# Patient Record
Sex: MALE | Race: WHITE | Employment: FULL TIME | URBAN - METROPOLITAN AREA
[De-identification: names, ages, dates, MRNs, and addresses within clinical notes are randomized per-mention and may not be internally consistent; named-entity substitution may affect disease eponyms.]

---

## 2018-05-11 ENCOUNTER — OFFICE VISIT (OUTPATIENT)
Dept: FAMILY MEDICINE CLINIC | Age: 53
End: 2018-05-11

## 2018-05-11 VITALS
WEIGHT: 216.4 LBS | BODY MASS INDEX: 29.35 KG/M2 | HEART RATE: 90 BPM | SYSTOLIC BLOOD PRESSURE: 122 MMHG | OXYGEN SATURATION: 95 % | DIASTOLIC BLOOD PRESSURE: 72 MMHG

## 2018-05-11 DIAGNOSIS — M10.9 ACUTE GOUT INVOLVING TOE OF LEFT FOOT, UNSPECIFIED CAUSE: ICD-10-CM

## 2018-05-11 DIAGNOSIS — M10.9 ACUTE GOUT INVOLVING TOE OF LEFT FOOT, UNSPECIFIED CAUSE: Primary | ICD-10-CM

## 2018-05-11 DIAGNOSIS — M79.671 PAIN OF RIGHT HEEL: ICD-10-CM

## 2018-05-11 LAB — URIC ACID, SERUM: 8.9 MG/DL (ref 3.5–7.2)

## 2018-05-11 PROCEDURE — 99213 OFFICE O/P EST LOW 20 MIN: CPT | Performed by: FAMILY MEDICINE

## 2018-05-11 RX ORDER — COLCHICINE 0.6 MG/1
TABLET ORAL
Qty: 30 TABLET | Refills: 3 | Status: SHIPPED | OUTPATIENT
Start: 2018-05-11 | End: 2020-06-24

## 2018-05-11 ASSESSMENT — PATIENT HEALTH QUESTIONNAIRE - PHQ9
2. FEELING DOWN, DEPRESSED OR HOPELESS: 1
1. LITTLE INTEREST OR PLEASURE IN DOING THINGS: 1
SUM OF ALL RESPONSES TO PHQ QUESTIONS 1-9: 2
SUM OF ALL RESPONSES TO PHQ9 QUESTIONS 1 & 2: 2

## 2018-05-14 ENCOUNTER — PATIENT MESSAGE (OUTPATIENT)
Dept: FAMILY MEDICINE CLINIC | Age: 53
End: 2018-05-14

## 2018-05-14 RX ORDER — ALLOPURINOL 300 MG/1
300 TABLET ORAL DAILY
Qty: 30 TABLET | Refills: 3 | Status: SHIPPED | OUTPATIENT
Start: 2018-05-14 | End: 2018-08-14 | Stop reason: SDUPTHER

## 2018-05-14 RX ORDER — PREDNISONE 20 MG/1
40 TABLET ORAL DAILY
Qty: 14 TABLET | Refills: 0 | Status: SHIPPED | OUTPATIENT
Start: 2018-05-14 | End: 2018-05-21

## 2018-05-18 ENCOUNTER — HOSPITAL ENCOUNTER (OUTPATIENT)
Dept: VASCULAR LAB | Age: 53
Discharge: HOME | End: 2018-05-18
Attending: FAMILY MEDICINE | Admitting: FAMILY MEDICINE

## 2018-05-18 ENCOUNTER — OFFICE VISIT (OUTPATIENT)
Dept: FAMILY MEDICINE CLINIC | Age: 53
End: 2018-05-18

## 2018-05-18 VITALS
WEIGHT: 216.2 LBS | BODY MASS INDEX: 29.32 KG/M2 | OXYGEN SATURATION: 97 % | SYSTOLIC BLOOD PRESSURE: 112 MMHG | HEART RATE: 73 BPM | DIASTOLIC BLOOD PRESSURE: 72 MMHG

## 2018-05-18 DIAGNOSIS — M79.661 RIGHT CALF PAIN: Primary | ICD-10-CM

## 2018-05-18 DIAGNOSIS — M79.661 PAIN OF RIGHT LOWER LEG: ICD-10-CM

## 2018-05-18 DIAGNOSIS — I82.491 ACUTE DEEP VEIN THROMBOSIS (DVT) OF OTHER SPECIFIED VEIN OF RIGHT LOWER EXTREMITY (HCC): ICD-10-CM

## 2018-05-18 PROBLEM — I82.409 DEEP VENOUS THROMBOSIS (HCC): Status: ACTIVE | Noted: 2018-05-18

## 2018-05-18 PROCEDURE — 99213 OFFICE O/P EST LOW 20 MIN: CPT | Performed by: FAMILY MEDICINE

## 2018-06-28 ENCOUNTER — OFFICE VISIT (OUTPATIENT)
Dept: FAMILY MEDICINE CLINIC | Age: 53
End: 2018-06-28

## 2018-06-28 VITALS
BODY MASS INDEX: 28.77 KG/M2 | DIASTOLIC BLOOD PRESSURE: 78 MMHG | HEIGHT: 72 IN | WEIGHT: 212.4 LBS | SYSTOLIC BLOOD PRESSURE: 112 MMHG | HEART RATE: 97 BPM | OXYGEN SATURATION: 97 %

## 2018-06-28 DIAGNOSIS — M10.072 ACUTE IDIOPATHIC GOUT OF LEFT FOOT: ICD-10-CM

## 2018-06-28 DIAGNOSIS — Z23 NEED FOR DIPHTHERIA-TETANUS-PERTUSSIS (TDAP) VACCINE: ICD-10-CM

## 2018-06-28 DIAGNOSIS — Z00.00 WELL ADULT EXAM: Primary | ICD-10-CM

## 2018-06-28 DIAGNOSIS — I82.491 ACUTE DEEP VEIN THROMBOSIS (DVT) OF OTHER SPECIFIED VEIN OF RIGHT LOWER EXTREMITY (HCC): ICD-10-CM

## 2018-06-28 PROCEDURE — 99396 PREV VISIT EST AGE 40-64: CPT | Performed by: FAMILY MEDICINE

## 2018-06-28 PROCEDURE — 90715 TDAP VACCINE 7 YRS/> IM: CPT | Performed by: FAMILY MEDICINE

## 2018-06-28 PROCEDURE — 90471 IMMUNIZATION ADMIN: CPT | Performed by: FAMILY MEDICINE

## 2018-06-28 ASSESSMENT — PATIENT HEALTH QUESTIONNAIRE - PHQ9
SUM OF ALL RESPONSES TO PHQ9 QUESTIONS 1 & 2: 0
1. LITTLE INTEREST OR PLEASURE IN DOING THINGS: 0
2. FEELING DOWN, DEPRESSED OR HOPELESS: 0
SUM OF ALL RESPONSES TO PHQ QUESTIONS 1-9: 0

## 2018-06-28 NOTE — PROGRESS NOTES
Ameya Avila MD ([facility])  Discussed recommendations about if needs lifelong tx or not, will refer to heme for their recs    Acute idiopathic gout of left foot  -Stable, continue current medications. Discussed starting allopurinol when no sx for a month, if starts to flare can overlap with colchicine    Need for diphtheria-tetanus-pertussis (Tdap) vaccine  -     Tdap (age 10y-63y) IM (Adacel)    Other orders  -     zoster recombinant adjuvanted vaccine (SHINGRIX) 50 MCG SUSR injection;  Inject 0.5 mLs into the muscle once for 1 dose Repeat second dose in 2-6 months

## 2018-07-06 ENCOUNTER — TELEPHONE (OUTPATIENT)
Dept: FAMILY MEDICINE CLINIC | Age: 53
End: 2018-07-06

## 2018-08-14 RX ORDER — ALLOPURINOL 300 MG/1
300 TABLET ORAL DAILY
Qty: 30 TABLET | Refills: 5 | Status: SHIPPED | OUTPATIENT
Start: 2018-08-14 | End: 2018-09-14 | Stop reason: SDUPTHER

## 2018-08-14 NOTE — TELEPHONE ENCOUNTER
From: Osmar Kumar  Sent: 8/13/2018 9:52 PM EDT  Subject: Medication Renewal Request    Osmar Kumar would like a refill of the following medications:     allopurinol (ZYLOPRIM) 300 MG tablet Suzanna Loera MD]    Preferred pharmacy: [address] [place] [address] [phone] - F [phone]    Comment:

## 2018-09-14 RX ORDER — ALLOPURINOL 300 MG/1
300 TABLET ORAL DAILY
Qty: 30 TABLET | Refills: 5 | Status: SHIPPED | OUTPATIENT
Start: 2018-09-14 | End: 2018-10-22 | Stop reason: SDUPTHER

## 2018-09-14 NOTE — TELEPHONE ENCOUNTER
From: Navin Fraser  Sent: 9/13/2018 11:06 PM EDT  Subject: Medication Renewal Request    Navin Fraser would like a refill of the following medications:     allopurinol (ZYLOPRIM) 300 MG tablet Brittany Ribeiro MD]    Preferred pharmacy: [address] [place] [address] [phone] - F [phone]    Comment:

## 2018-10-19 RX ORDER — ALLOPURINOL 300 MG/1
300 TABLET ORAL DAILY
Qty: 30 TABLET | Refills: 5 | Status: CANCELLED | OUTPATIENT
Start: 2018-10-19

## 2018-10-19 NOTE — TELEPHONE ENCOUNTER
From: Giacomo Walter  Sent: 10/19/2018 9:37 AM EDT  Subject: Medication Renewal Request    Giacomo Walter would like a refill of the following medications:     allopurinol (ZYLOPRIM) 300 MG tablet Courtney Rodriguez MD]    Preferred pharmacy: [address] [place] 688 [phone] - F [phone]    Comment:

## 2018-10-22 RX ORDER — ALLOPURINOL 300 MG/1
300 TABLET ORAL DAILY
Qty: 30 TABLET | Refills: 5 | Status: SHIPPED | OUTPATIENT
Start: 2018-10-22 | End: 2019-05-07 | Stop reason: SDUPTHER

## 2018-11-28 RX ORDER — ALLOPURINOL 300 MG/1
300 TABLET ORAL DAILY
Qty: 30 TABLET | Refills: 5 | Status: CANCELLED | OUTPATIENT
Start: 2018-11-28

## 2019-05-07 RX ORDER — ALLOPURINOL 300 MG/1
300 TABLET ORAL DAILY
Qty: 30 TABLET | Refills: 1 | Status: SHIPPED | OUTPATIENT
Start: 2019-05-07 | End: 2019-07-05 | Stop reason: SDUPTHER

## 2019-07-06 RX ORDER — ALLOPURINOL 300 MG/1
300 TABLET ORAL DAILY
Qty: 30 TABLET | Refills: 0 | Status: SHIPPED | OUTPATIENT
Start: 2019-07-06 | End: 2019-10-08 | Stop reason: SDUPTHER

## 2019-08-02 RX ORDER — ALLOPURINOL 300 MG/1
300 TABLET ORAL DAILY
Qty: 30 TABLET | Refills: 0 | OUTPATIENT
Start: 2019-08-02

## 2019-08-05 RX ORDER — ALLOPURINOL 300 MG/1
300 TABLET ORAL DAILY
Qty: 30 TABLET | Refills: 0 | OUTPATIENT
Start: 2019-08-05

## 2019-09-30 RX ORDER — ALLOPURINOL 300 MG/1
300 TABLET ORAL DAILY
Qty: 30 TABLET | Refills: 0 | OUTPATIENT
Start: 2019-09-30

## 2019-10-08 ENCOUNTER — OFFICE VISIT (OUTPATIENT)
Dept: FAMILY MEDICINE CLINIC | Age: 54
End: 2019-10-08
Payer: COMMERCIAL

## 2019-10-08 VITALS
WEIGHT: 216 LBS | OXYGEN SATURATION: 98 % | DIASTOLIC BLOOD PRESSURE: 84 MMHG | BODY MASS INDEX: 29.26 KG/M2 | SYSTOLIC BLOOD PRESSURE: 126 MMHG | HEIGHT: 72 IN | HEART RATE: 80 BPM

## 2019-10-08 DIAGNOSIS — Z00.00 WELL ADULT EXAM: Primary | ICD-10-CM

## 2019-10-08 DIAGNOSIS — Z12.11 SCREEN FOR COLON CANCER: ICD-10-CM

## 2019-10-08 DIAGNOSIS — Z12.5 SCREENING PSA (PROSTATE SPECIFIC ANTIGEN): ICD-10-CM

## 2019-10-08 DIAGNOSIS — M10.072 ACUTE IDIOPATHIC GOUT OF LEFT FOOT: ICD-10-CM

## 2019-10-08 DIAGNOSIS — E78.00 PURE HYPERCHOLESTEROLEMIA: ICD-10-CM

## 2019-10-08 PROCEDURE — 99396 PREV VISIT EST AGE 40-64: CPT | Performed by: FAMILY MEDICINE

## 2019-10-08 RX ORDER — ALLOPURINOL 300 MG/1
300 TABLET ORAL DAILY
Qty: 90 TABLET | Refills: 3 | Status: SHIPPED | OUTPATIENT
Start: 2019-10-08 | End: 2019-10-08 | Stop reason: SDUPTHER

## 2019-10-08 RX ORDER — ALLOPURINOL 300 MG/1
300 TABLET ORAL DAILY
Qty: 30 TABLET | Refills: 3 | Status: SHIPPED | OUTPATIENT
Start: 2019-10-08 | End: 2020-12-22

## 2019-10-08 SDOH — HEALTH STABILITY: MENTAL HEALTH: HOW OFTEN DO YOU HAVE A DRINK CONTAINING ALCOHOL?: 2-3 TIMES A WEEK

## 2019-10-08 SDOH — HEALTH STABILITY: MENTAL HEALTH: HOW MANY STANDARD DRINKS CONTAINING ALCOHOL DO YOU HAVE ON A TYPICAL DAY?: 3 OR 4

## 2019-10-08 ASSESSMENT — PATIENT HEALTH QUESTIONNAIRE - PHQ9
1. LITTLE INTEREST OR PLEASURE IN DOING THINGS: 0
SUM OF ALL RESPONSES TO PHQ QUESTIONS 1-9: 0
SUM OF ALL RESPONSES TO PHQ9 QUESTIONS 1 & 2: 0
SUM OF ALL RESPONSES TO PHQ QUESTIONS 1-9: 0
2. FEELING DOWN, DEPRESSED OR HOPELESS: 0

## 2019-10-17 LAB
PROSTATE SPECIFIC ANTIGEN: 1.2 NG/ML
URIC ACID, SERUM: 5.7 MG/DL (ref 4–8)

## 2020-06-09 ENCOUNTER — APPOINTMENT (OUTPATIENT)
Dept: GENERAL RADIOLOGY | Age: 55
End: 2020-06-09
Payer: COMMERCIAL

## 2020-06-09 ENCOUNTER — HOSPITAL ENCOUNTER (EMERGENCY)
Age: 55
Discharge: HOME | End: 2020-06-09
Attending: EMERGENCY MEDICINE
Payer: COMMERCIAL

## 2020-06-09 VITALS
HEART RATE: 85 BPM | OXYGEN SATURATION: 95 % | TEMPERATURE: 98.5 F | RESPIRATION RATE: 18 BRPM | DIASTOLIC BLOOD PRESSURE: 83 MMHG | SYSTOLIC BLOOD PRESSURE: 139 MMHG

## 2020-06-09 PROCEDURE — 73060 X-RAY EXAM OF HUMERUS: CPT

## 2020-06-09 PROCEDURE — 99283 EMERGENCY DEPT VISIT LOW MDM: CPT

## 2020-06-09 PROCEDURE — 73080 X-RAY EXAM OF ELBOW: CPT

## 2020-06-09 ASSESSMENT — ENCOUNTER SYMPTOMS
ABDOMINAL PAIN: 0
NAUSEA: 0
SHORTNESS OF BREATH: 0
VOMITING: 0

## 2020-06-09 ASSESSMENT — PAIN SCALES - GENERAL: PAINLEVEL_OUTOF10: 5

## 2020-06-10 ENCOUNTER — TELEPHONE (OUTPATIENT)
Dept: ORTHOPEDIC SURGERY | Age: 55
End: 2020-06-10

## 2020-06-10 ENCOUNTER — OFFICE VISIT (OUTPATIENT)
Dept: ORTHOPEDIC SURGERY | Age: 55
End: 2020-06-10
Payer: COMMERCIAL

## 2020-06-10 VITALS — HEIGHT: 72 IN | BODY MASS INDEX: 29.26 KG/M2 | WEIGHT: 216 LBS | TEMPERATURE: 97.5 F

## 2020-06-10 PROCEDURE — 99203 OFFICE O/P NEW LOW 30 MIN: CPT | Performed by: ORTHOPAEDIC SURGERY

## 2020-06-10 NOTE — ED PROVIDER NOTES
Oropharynx is clear. Eyes:      General:         Right eye: No discharge. Left eye: No discharge. Conjunctiva/sclera: Conjunctivae normal.      Pupils: Pupils are equal, round, and reactive to light. Neck:      Musculoskeletal: Normal range of motion and neck supple. Cardiovascular:      Rate and Rhythm: Normal rate and regular rhythm. Heart sounds: Normal heart sounds. No murmur. No gallop. Pulmonary:      Effort: Pulmonary effort is normal. No respiratory distress. Breath sounds: Normal breath sounds. No wheezing or rales. Musculoskeletal:      Right shoulder: Normal.      Right elbow: He exhibits decreased range of motion. He exhibits no swelling, no effusion, no deformity and no laceration. Skin:     General: Skin is warm and dry. Capillary Refill: Capillary refill takes less than 2 seconds. Coloration: Skin is not pale. Neurological:      General: No focal deficit present. Mental Status: He is alert and oriented to person, place, and time. Psychiatric:         Mood and Affect: Mood normal.         Behavior: Behavior normal.         DIAGNOSTIC RESULTS   LABS:    Labs Reviewed - No data to display    All other labs were within normal range or not returned as of this dictation. EKG: All EKG's are interpreted by the Emergency Department Physician in the absence of a cardiologist.  Please see their note for interpretation of EKG. RADIOLOGY:   Non-plain film images such as CT, Ultrasound and MRI are read by the radiologist. Plain radiographic images are visualized and preliminarily interpreted by the ED Provider with the below findings:        Interpretation per the Radiologist below, if available at the time of this note:    XR HUMERUS RIGHT (MIN 2 VIEWS)   Final Result   Unremarkable radiographs right humerus. Follow-up imaging recommended if pain persists or worsens following   conservative management.          XR ELBOW RIGHT (MIN 3 VIEWS) NEUROVASCULAR INJURY, thus I consider the discharge disposition reasonable. FINAL IMPRESSION      1.  Traumatic rupture of right distal biceps tendon, initial encounter          DISPOSITION/PLAN   DISPOSITION Discharge - Pending Orders Complete 06/09/2020 08:41:43 PM      PATIENT REFERREDTO:  [facility] Emergency Department  [address] [facility] [place]  [phone]    If symptoms worsen    Piper Pedersen MD  [facility], [address] [place] [address],[address]  [address] [place] [address]  [phone]    Schedule an appointment as soon as possible for a visit in 3 days  for re-evaluation      DISCHARGE MEDICATIONS:  New Prescriptions    No medications on file       DISCONTINUED MEDICATIONS:  Discontinued Medications    No medications on file              (Please note that portions of this note were completed with a voice recognition program.  Efforts were made to edit the dictations but occasionally words are mis-transcribed.)    Nusrat Carroll PA-C (electronically signed)            Nusrat Carroll PA-C  06/10/20 [id number]

## 2020-06-10 NOTE — PROGRESS NOTES
higher the chance that repair will be difficult because of scarring. Waiting until June 26, will put him at 2 1/2 weeks after injury, which should be fine.

## 2020-06-11 ENCOUNTER — HOSPITAL ENCOUNTER (OUTPATIENT)
Dept: MRI IMAGING | Age: 55
Discharge: HOME | End: 2020-06-11
Payer: COMMERCIAL

## 2020-06-11 ENCOUNTER — TELEPHONE (OUTPATIENT)
Dept: ORTHOPEDIC SURGERY | Age: 55
End: 2020-06-11

## 2020-06-11 PROCEDURE — 73221 MRI JOINT UPR EXTREM W/O DYE: CPT

## 2020-06-12 ENCOUNTER — PATIENT MESSAGE (OUTPATIENT)
Dept: ORTHOPEDIC SURGERY | Age: 55
End: 2020-06-12

## 2020-06-21 ENCOUNTER — PATIENT MESSAGE (OUTPATIENT)
Dept: ORTHOPEDIC SURGERY | Age: 55
End: 2020-06-21

## 2020-06-22 ENCOUNTER — OFFICE VISIT (OUTPATIENT)
Dept: FAMILY MEDICINE CLINIC | Age: 55
End: 2020-06-22
Payer: COMMERCIAL

## 2020-06-22 ENCOUNTER — OFFICE VISIT (OUTPATIENT)
Dept: PRIMARY CARE CLINIC | Age: 55
End: 2020-06-22
Payer: COMMERCIAL

## 2020-06-22 VITALS
WEIGHT: 215 LBS | DIASTOLIC BLOOD PRESSURE: 72 MMHG | HEART RATE: 79 BPM | OXYGEN SATURATION: 97 % | SYSTOLIC BLOOD PRESSURE: 116 MMHG | BODY MASS INDEX: 29.57 KG/M2 | TEMPERATURE: 99.2 F

## 2020-06-22 PROCEDURE — 99211 OFF/OP EST MAY X REQ PHY/QHP: CPT | Performed by: NURSE PRACTITIONER

## 2020-06-22 PROCEDURE — 99214 OFFICE O/P EST MOD 30 MIN: CPT | Performed by: NURSE PRACTITIONER

## 2020-06-24 ENCOUNTER — ANESTHESIA EVENT (OUTPATIENT)
Dept: OPERATING ROOM | Age: 55
End: 2020-06-24
Payer: COMMERCIAL

## 2020-06-24 LAB
SARS-COV-2: NOT DETECTED
SOURCE: NORMAL

## 2020-06-24 NOTE — RESULT ENCOUNTER NOTE
Please contact patient with their testing results: Your test for COVID-19, also known as novel coronavirus, came back negative. No virus was detected from the sample collected. Until your symptoms are fully resolved, you may still be contagious. We recommend that you remain isolated for 7 days minimum or 72 hours after your symptoms have completely resolved, whichever is longer. Continually monitor symptoms. Contact a medical provider if symptoms are worsening. If you have any additional questions, contact your PCP.     For additional information, please visit the Centers for Disease Control and Prevention   [email or web address]

## 2020-06-26 ENCOUNTER — HOSPITAL ENCOUNTER (OUTPATIENT)
Age: 55
Setting detail: OUTPATIENT SURGERY
Discharge: HOME | End: 2020-06-26
Attending: ORTHOPAEDIC SURGERY | Admitting: ORTHOPAEDIC SURGERY
Payer: COMMERCIAL

## 2020-06-26 ENCOUNTER — ANESTHESIA (OUTPATIENT)
Dept: OPERATING ROOM | Age: 55
End: 2020-06-26
Payer: COMMERCIAL

## 2020-06-26 VITALS
RESPIRATION RATE: 12 BRPM | HEART RATE: 70 BPM | WEIGHT: 217.81 LBS | HEIGHT: 72 IN | SYSTOLIC BLOOD PRESSURE: 142 MMHG | OXYGEN SATURATION: 98 % | DIASTOLIC BLOOD PRESSURE: 78 MMHG | TEMPERATURE: 97 F | BODY MASS INDEX: 29.5 KG/M2

## 2020-06-26 VITALS
RESPIRATION RATE: 9 BRPM | SYSTOLIC BLOOD PRESSURE: 99 MMHG | DIASTOLIC BLOOD PRESSURE: 55 MMHG | TEMPERATURE: 96.8 F | OXYGEN SATURATION: 95 %

## 2020-06-26 PROCEDURE — 64415 NJX AA&/STRD BRCH PLXS IMG: CPT | Performed by: ANESTHESIOLOGY

## 2020-06-26 PROCEDURE — 3600000014 HC SURGERY LEVEL 4 ADDTL 15MIN: Performed by: ORTHOPAEDIC SURGERY

## 2020-06-26 PROCEDURE — 3700000001 HC ADD 15 MINUTES (ANESTHESIA): Performed by: ORTHOPAEDIC SURGERY

## 2020-06-26 PROCEDURE — 7100000010 HC PHASE II RECOVERY - FIRST 15 MIN: Performed by: ORTHOPAEDIC SURGERY

## 2020-06-26 PROCEDURE — 2580000003 HC RX 258: Performed by: ANESTHESIOLOGY

## 2020-06-26 PROCEDURE — 6360000002 HC RX W HCPCS: Performed by: ANESTHESIOLOGY

## 2020-06-26 PROCEDURE — 2500000003 HC RX 250 WO HCPCS: Performed by: NURSE ANESTHETIST, CERTIFIED REGISTERED

## 2020-06-26 PROCEDURE — 3700000000 HC ANESTHESIA ATTENDED CARE: Performed by: ORTHOPAEDIC SURGERY

## 2020-06-26 PROCEDURE — 7100000001 HC PACU RECOVERY - ADDTL 15 MIN: Performed by: ORTHOPAEDIC SURGERY

## 2020-06-26 PROCEDURE — 24342 REPAIR OF RUPTURED TENDON: CPT | Performed by: ORTHOPAEDIC SURGERY

## 2020-06-26 PROCEDURE — 6360000002 HC RX W HCPCS: Performed by: NURSE ANESTHETIST, CERTIFIED REGISTERED

## 2020-06-26 PROCEDURE — 2709999900 HC NON-CHARGEABLE SUPPLY: Performed by: ORTHOPAEDIC SURGERY

## 2020-06-26 PROCEDURE — 2580000003 HC RX 258: Performed by: ORTHOPAEDIC SURGERY

## 2020-06-26 PROCEDURE — C1713 ANCHOR/SCREW BN/BN,TIS/BN: HCPCS | Performed by: ORTHOPAEDIC SURGERY

## 2020-06-26 PROCEDURE — 2500000003 HC RX 250 WO HCPCS: Performed by: ORTHOPAEDIC SURGERY

## 2020-06-26 PROCEDURE — 3600000004 HC SURGERY LEVEL 4 BASE: Performed by: ORTHOPAEDIC SURGERY

## 2020-06-26 PROCEDURE — 7100000000 HC PACU RECOVERY - FIRST 15 MIN: Performed by: ORTHOPAEDIC SURGERY

## 2020-06-26 PROCEDURE — 6370000000 HC RX 637 (ALT 250 FOR IP): Performed by: ANESTHESIOLOGY

## 2020-06-26 PROCEDURE — 7100000011 HC PHASE II RECOVERY - ADDTL 15 MIN: Performed by: ORTHOPAEDIC SURGERY

## 2020-06-26 DEVICE — SYSTEM IMPL DST BICEPS REP DEL W/ BICEPS BTTN 7X10MM PEEK: Type: IMPLANTABLE DEVICE | Site: ARM | Status: FUNCTIONAL

## 2020-06-26 RX ORDER — LIDOCAINE HYDROCHLORIDE 20 MG/ML
INJECTION, SOLUTION EPIDURAL; INFILTRATION; INTRACAUDAL; PERINEURAL PRN
Status: DISCONTINUED | OUTPATIENT
Start: 2020-06-26 | End: 2020-06-26 | Stop reason: SDUPTHER

## 2020-06-26 RX ORDER — LABETALOL HYDROCHLORIDE 5 MG/ML
5 INJECTION, SOLUTION INTRAVENOUS EVERY 10 MIN PRN
Status: DISCONTINUED | OUTPATIENT
Start: 2020-06-26 | End: 2020-06-26 | Stop reason: HOSPADM

## 2020-06-26 RX ORDER — OXYCODONE HYDROCHLORIDE AND ACETAMINOPHEN 5; 325 MG/1; MG/1
1 TABLET ORAL EVERY 4 HOURS PRN
Qty: 40 TABLET | Refills: 0 | Status: SHIPPED | OUTPATIENT
Start: 2020-06-26 | End: 2020-07-03

## 2020-06-26 RX ORDER — PROMETHAZINE HYDROCHLORIDE 25 MG/1
25 TABLET ORAL EVERY 6 HOURS PRN
Qty: 5 TABLET | Refills: 0 | Status: SHIPPED | OUTPATIENT
Start: 2020-06-26 | End: 2020-08-18

## 2020-06-26 RX ORDER — PROPOFOL 10 MG/ML
INJECTION, EMULSION INTRAVENOUS PRN
Status: DISCONTINUED | OUTPATIENT
Start: 2020-06-26 | End: 2020-06-26 | Stop reason: SDUPTHER

## 2020-06-26 RX ORDER — DEXAMETHASONE SODIUM PHOSPHATE 4 MG/ML
INJECTION, SOLUTION INTRA-ARTICULAR; INTRALESIONAL; INTRAMUSCULAR; INTRAVENOUS; SOFT TISSUE PRN
Status: DISCONTINUED | OUTPATIENT
Start: 2020-06-26 | End: 2020-06-26 | Stop reason: SDUPTHER

## 2020-06-26 RX ORDER — CLINDAMYCIN PHOSPHATE 900 MG/50ML
900 INJECTION INTRAVENOUS ONCE
Status: COMPLETED | OUTPATIENT
Start: 2020-06-26 | End: 2020-06-26

## 2020-06-26 RX ORDER — FENTANYL CITRATE 50 UG/ML
INJECTION, SOLUTION INTRAMUSCULAR; INTRAVENOUS PRN
Status: DISCONTINUED | OUTPATIENT
Start: 2020-06-26 | End: 2020-06-26 | Stop reason: SDUPTHER

## 2020-06-26 RX ORDER — OXYCODONE HYDROCHLORIDE AND ACETAMINOPHEN 5; 325 MG/1; MG/1
1 TABLET ORAL
Status: DISCONTINUED | OUTPATIENT
Start: 2020-06-26 | End: 2020-06-26 | Stop reason: HOSPADM

## 2020-06-26 RX ORDER — FENTANYL CITRATE 50 UG/ML
25 INJECTION, SOLUTION INTRAMUSCULAR; INTRAVENOUS EVERY 5 MIN PRN
Status: DISCONTINUED | OUTPATIENT
Start: 2020-06-26 | End: 2020-06-26 | Stop reason: HOSPADM

## 2020-06-26 RX ORDER — PROMETHAZINE HYDROCHLORIDE 25 MG/ML
6.25 INJECTION, SOLUTION INTRAMUSCULAR; INTRAVENOUS
Status: DISCONTINUED | OUTPATIENT
Start: 2020-06-26 | End: 2020-06-26 | Stop reason: HOSPADM

## 2020-06-26 RX ORDER — OXYCODONE HYDROCHLORIDE AND ACETAMINOPHEN 5; 325 MG/1; MG/1
2 TABLET ORAL
Status: COMPLETED | OUTPATIENT
Start: 2020-06-26 | End: 2020-06-26

## 2020-06-26 RX ORDER — SODIUM CHLORIDE 0.9 % (FLUSH) 0.9 %
10 SYRINGE (ML) INJECTION PRN
Status: DISCONTINUED | OUTPATIENT
Start: 2020-06-26 | End: 2020-06-26 | Stop reason: HOSPADM

## 2020-06-26 RX ORDER — SODIUM CHLORIDE 9 MG/ML
INJECTION, SOLUTION INTRAVENOUS CONTINUOUS
Status: DISCONTINUED | OUTPATIENT
Start: 2020-06-26 | End: 2020-06-26 | Stop reason: HOSPADM

## 2020-06-26 RX ORDER — MIDAZOLAM HYDROCHLORIDE 1 MG/ML
INJECTION INTRAMUSCULAR; INTRAVENOUS PRN
Status: DISCONTINUED | OUTPATIENT
Start: 2020-06-26 | End: 2020-06-26 | Stop reason: SDUPTHER

## 2020-06-26 RX ORDER — ROPIVACAINE HYDROCHLORIDE 5 MG/ML
INJECTION, SOLUTION EPIDURAL; INFILTRATION; PERINEURAL
Status: COMPLETED | OUTPATIENT
Start: 2020-06-26 | End: 2020-06-26

## 2020-06-26 RX ORDER — ONDANSETRON 2 MG/ML
INJECTION INTRAMUSCULAR; INTRAVENOUS PRN
Status: DISCONTINUED | OUTPATIENT
Start: 2020-06-26 | End: 2020-06-26 | Stop reason: SDUPTHER

## 2020-06-26 RX ORDER — PHENYLEPHRINE HCL IN 0.9% NACL 1 MG/10 ML
SYRINGE (ML) INTRAVENOUS PRN
Status: DISCONTINUED | OUTPATIENT
Start: 2020-06-26 | End: 2020-06-26 | Stop reason: SDUPTHER

## 2020-06-26 RX ORDER — MAGNESIUM HYDROXIDE 1200 MG/15ML
LIQUID ORAL CONTINUOUS PRN
Status: COMPLETED | OUTPATIENT
Start: 2020-06-26 | End: 2020-06-26

## 2020-06-26 RX ORDER — SODIUM CHLORIDE 0.9 % (FLUSH) 0.9 %
10 SYRINGE (ML) INJECTION EVERY 12 HOURS SCHEDULED
Status: DISCONTINUED | OUTPATIENT
Start: 2020-06-26 | End: 2020-06-26 | Stop reason: HOSPADM

## 2020-06-26 RX ADMIN — Medication 100 MCG: at 09:47

## 2020-06-26 RX ADMIN — SODIUM CHLORIDE: 9 INJECTION, SOLUTION INTRAVENOUS at 07:48

## 2020-06-26 RX ADMIN — HYDROMORPHONE HYDROCHLORIDE 0.5 MG: 1 INJECTION, SOLUTION INTRAMUSCULAR; INTRAVENOUS; SUBCUTANEOUS at 09:33

## 2020-06-26 RX ADMIN — OXYCODONE HYDROCHLORIDE AND ACETAMINOPHEN 2 TABLET: 5; 325 TABLET ORAL at 12:03

## 2020-06-26 RX ADMIN — Medication 100 MCG: at 09:55

## 2020-06-26 RX ADMIN — ROPIVACAINE HYDROCHLORIDE 25 ML: 5 INJECTION, SOLUTION EPIDURAL; INFILTRATION; PERINEURAL at 08:22

## 2020-06-26 RX ADMIN — PROPOFOL 200 MG: 10 INJECTION, EMULSION INTRAVENOUS at 09:24

## 2020-06-26 RX ADMIN — Medication 200 MCG: at 10:12

## 2020-06-26 RX ADMIN — HYDROMORPHONE HYDROCHLORIDE 0.5 MG: 1 INJECTION, SOLUTION INTRAMUSCULAR; INTRAVENOUS; SUBCUTANEOUS at 09:40

## 2020-06-26 RX ADMIN — FENTANYL CITRATE 100 MCG: 50 INJECTION INTRAMUSCULAR; INTRAVENOUS at 08:16

## 2020-06-26 RX ADMIN — ONDANSETRON 4 MG: 2 INJECTION INTRAMUSCULAR; INTRAVENOUS at 10:25

## 2020-06-26 RX ADMIN — LIDOCAINE HYDROCHLORIDE 100 MG: 20 INJECTION, SOLUTION EPIDURAL; INFILTRATION; INTRACAUDAL; PERINEURAL at 09:24

## 2020-06-26 RX ADMIN — MIDAZOLAM 2 MG: 1 INJECTION INTRAMUSCULAR; INTRAVENOUS at 08:16

## 2020-06-26 RX ADMIN — SODIUM CHLORIDE: 9 INJECTION, SOLUTION INTRAVENOUS at 09:16

## 2020-06-26 RX ADMIN — CLINDAMYCIN PHOSPHATE 900 MG: 18 INJECTION, SOLUTION INTRAMUSCULAR; INTRAVENOUS at 09:21

## 2020-06-26 RX ADMIN — DEXAMETHASONE SODIUM PHOSPHATE 10 MG: 4 INJECTION, SOLUTION INTRAMUSCULAR; INTRAVENOUS at 09:40

## 2020-06-26 ASSESSMENT — PULMONARY FUNCTION TESTS
PIF_VALUE: 13
PIF_VALUE: 2
PIF_VALUE: 2
PIF_VALUE: 13
PIF_VALUE: 2
PIF_VALUE: 1
PIF_VALUE: 2
PIF_VALUE: 2
PIF_VALUE: 0
PIF_VALUE: 2
PIF_VALUE: 3
PIF_VALUE: 15
PIF_VALUE: 2
PIF_VALUE: 13
PIF_VALUE: 15
PIF_VALUE: 2
PIF_VALUE: 18
PIF_VALUE: 2
PIF_VALUE: 2
PIF_VALUE: 12
PIF_VALUE: 2
PIF_VALUE: 3
PIF_VALUE: 2
PIF_VALUE: 2
PIF_VALUE: 15
PIF_VALUE: 2
PIF_VALUE: 11
PIF_VALUE: 2
PIF_VALUE: 13
PIF_VALUE: 2
PIF_VALUE: 0
PIF_VALUE: 2
PIF_VALUE: 13
PIF_VALUE: 13
PIF_VALUE: 1
PIF_VALUE: 0
PIF_VALUE: 2
PIF_VALUE: 15
PIF_VALUE: 2
PIF_VALUE: 12
PIF_VALUE: 2
PIF_VALUE: 2
PIF_VALUE: 13
PIF_VALUE: 16
PIF_VALUE: 2

## 2020-06-26 ASSESSMENT — PAIN - FUNCTIONAL ASSESSMENT
PAIN_FUNCTIONAL_ASSESSMENT: 0-10
PAIN_FUNCTIONAL_ASSESSMENT: PREVENTS OR INTERFERES WITH ALL ACTIVE AND SOME PASSIVE ACTIVITIES
PAIN_FUNCTIONAL_ASSESSMENT: PREVENTS OR INTERFERES WITH ALL ACTIVE AND SOME PASSIVE ACTIVITIES
PAIN_FUNCTIONAL_ASSESSMENT: PREVENTS OR INTERFERES SOME ACTIVE ACTIVITIES AND ADLS

## 2020-06-26 ASSESSMENT — PAIN DESCRIPTION - LOCATION
LOCATION: ARM

## 2020-06-26 ASSESSMENT — PAIN DESCRIPTION - ONSET
ONSET: ON-GOING
ONSET: ON-GOING
ONSET: GRADUAL
ONSET: ON-GOING
ONSET: ON-GOING

## 2020-06-26 ASSESSMENT — PAIN DESCRIPTION - DESCRIPTORS
DESCRIPTORS: ACHING
DESCRIPTORS: SORE

## 2020-06-26 ASSESSMENT — PAIN SCALES - GENERAL
PAINLEVEL_OUTOF10: 4
PAINLEVEL_OUTOF10: 5
PAINLEVEL_OUTOF10: 3
PAINLEVEL_OUTOF10: 5
PAINLEVEL_OUTOF10: 4

## 2020-06-26 ASSESSMENT — PAIN DESCRIPTION - FREQUENCY
FREQUENCY: CONTINUOUS

## 2020-06-26 ASSESSMENT — PAIN DESCRIPTION - PAIN TYPE
TYPE: SURGICAL PAIN

## 2020-06-26 ASSESSMENT — PAIN DESCRIPTION - ORIENTATION
ORIENTATION: RIGHT

## 2020-06-26 ASSESSMENT — PAIN DESCRIPTION - PROGRESSION
CLINICAL_PROGRESSION: NOT CHANGED
CLINICAL_PROGRESSION: GRADUALLY WORSENING
CLINICAL_PROGRESSION: GRADUALLY IMPROVING

## 2020-06-26 NOTE — H&P
Preoperative H&P Update    The patient's History and Physical in the medical record from 6/22/20 was reviewed by me today. I reviewed the HPI, medications, allergies, reason for surgery, diagnosis and treatment plan and there has been no interval change. The patient was examined by me today.  Physical exam findings for this update include:    BP (!) 154/90   Pulse 78   Temp 97 °F (36.1 °C) (Temporal)   Ht 5' 11.5\" (1.816 m)   Wt 217 lb 13 oz (98.8 kg)   SpO2 97%   BMI 29.96 kg/m²    Airway is intact  Chest: breathing comfortably  Heart: regular rate  Findings on exam of the body region where surgery is to be performed include: see last office and/or consult note        Electronically signed by Mony Keller MD on 6/26/2020 at 8:44 AM

## 2020-06-26 NOTE — ANESTHESIA PRE PROCEDURE
[facility] Department of Anesthesiology  Pre-Anesthesia Evaluation/Consultation       Name:  Con Pablo  : 1965  Age:  53 y. o. MRN:  [id number]  Date: 2020           Surgeon: Surgeon(s):  Jose F Beck MD    Procedure: Procedure(s):  RIGHT DISTAL BICEPS TENDON REPAIR     Allergies   Allergen Reactions    Pcn [Penicillins]      Not sure of reaction, happened as a child     Patient Active Problem List   Diagnosis    Arthritis of knee    Wrist pain    Deep venous thrombosis ([address]    Acute idiopathic gout of left foot     Past Medical History:   Diagnosis Date    Arthritis     Gout     History of blood clots     ,     Wrist pain      Past Surgical History:   Procedure Laterality Date    HAND SURGERY  1987    spiral fracture wtih pins    HAND SURGERY Right     right hand fracture     KNEE CARTILAGE SURGERY Right 2012    meniscus tear repair    OTHER SURGICAL HISTORY      B undescended testes    TONSILLECTOMY       Social History     Tobacco Use    Smoking status: Never Smoker    Smokeless tobacco: Never Used   Substance Use Topics    Alcohol use: Yes     Alcohol/week: 5.0 standard drinks     Types: 5 Cans of beer per week     Frequency: 2-3 times a week     Drinks per session: 3 or 4     Binge frequency: Never    Drug use: No     Medications  No current facility-administered medications on file prior to encounter.       Current Outpatient Medications on File Prior to Encounter   Medication Sig Dispense Refill    allopurinol (ZYLOPRIM) 300 MG tablet Take 1 tablet by mouth daily 30 tablet 3     Current Facility-Administered Medications   Medication Dose Route Frequency Provider Last Rate Last Dose    0.9 % sodium chloride infusion   Intravenous Continuous Aarti Morales MD        sodium chloride flush 0.9 % injection 10 mL  10 mL Intravenous 2 times per day Aarti Morales MD        sodium chloride flush 0.9 %

## 2020-06-26 NOTE — ANESTHESIA POSTPROCEDURE EVALUATION
[facility] Department of Anesthesiology  Post-Anesthesia Note       Name:  Asim Hinton                                  Age:  53 y.o. MRN:  [id number]     Last Vitals & Oxygen Saturation: BP (!) 142/78   Pulse 70   Temp 97 °F (36.1 °C) (Temporal)   Resp 12   Ht 5' 11.5\" (1.816 m)   Wt 217 lb 13 oz (98.8 kg)   SpO2 98%   BMI 29.96 kg/m²   Patient Vitals for the past 4 hrs:   BP Temp Temp src Pulse Resp SpO2   06/26/20 1230 (!) 142/78 -- -- 70 12 98 %   06/26/20 1138 138/79 97 °F (36.1 °C) Temporal 68 16 97 %   06/26/20 1102 126/88 -- -- 75 13 93 %   06/26/20 1057 130/80 -- -- 71 14 94 %   06/26/20 1055 -- -- -- 77 13 95 %   06/26/20 1052 94/74 -- -- 60 21 96 %   06/26/20 1047 107/71 -- -- 63 20 95 %   06/26/20 1043 97/76 -- -- 66 15 95 %   06/26/20 1041 -- 97 °F (36.1 °C) Temporal -- -- 94 %       Level of consciousness:  Awake, alert    Respiratory: Respirations easy, no distress. Stable. Cardiovascular: Hemodynamically stable. Hydration: Adequate. PONV: Adequately managed. Post-op pain: Adequately controlled. Post-op assessment: Tolerated anesthetic well without complication. Complications:  None. Nerve block intact. Moving fingers. No issues.     Maximino Santo MD  June 26, 2020   12:35 PM

## 2020-06-26 NOTE — PROGRESS NOTES
Up to chair. Eldon well. Sling right arm, motion, sensation  Present right hand,      2+radial pulse, warm, brisk cap refill.

## 2020-06-26 NOTE — OP NOTE
DATE OF PROCEDURE:  6/26/20     PREOPERATIVE DIAGNOSIS:  Right distal biceps tendon rupture.     POSTOPERATIVE DIAGNOSIS:  Right distal biceps tendon rupture.     OPERATION PERFORMED:  Rightdistal biceps tendon repair.     SURGEON:  Eloy Lemus MD     ASSISTANT:  Rehana Urrutia     ANESTHESIA:  General.     EBL:  Minimal.     COMPLICATIONS:  None.     DISPOSITION:  To PACU in good condition.     INDICATIONS:  The patient is a 50-year-old male who sustained a right elbow distal biceps tendon rupture. The patient was seen in the office and recommended operative fixation of the  distal biceps tendon rupture. The patient was explained the risks, benefits,  complications, and alternatives of the procedure and subsequently provided written informed consent for the procedure.     OPERATIVE PROCEDURE:  The patient was seen in the preoperative holding area. The operative arm was marked. The patient was also seen by anesthesia service. Patient was then brought to the operating room and was transferred onto the operating room table in supine position. Patient was then induced under general anesthesia. The patient received prophylactic preoperative IV antibiotics and had DVT prophylaxis with sequential compression devices on the bilateral lower extremities. A well-padded tourniquet was placed on the proximal operative arm. The operative arm was then sterilely prepped and draped in the usual fashion. A time-out was taken where the patient, the operative extremity, and the  operative procedure were once again verified. We then exsanguinated the limb with an Esmarch bandage, and tourniquet was inflated to 250 mmHg.     We identified the level of the bicipital tuberosity on the radius using fluoroscopy, and a horizontal incision was made centered over the radius measuring approximately 3 to 4 cm in width. This was a single transverse incision. Sharp dissection was carried down through the skin.   Blunt dissection was

## 2020-06-26 NOTE — PROGRESS NOTES
Pt arrived to phase 2 from PACU. Pt awake and alert. Right arm with ace wrap C/D/I. Cap refill WNL. +numbness to right thumb. Rates surgical pain as 5/10.   VSS

## 2020-07-14 ENCOUNTER — TELEPHONE (OUTPATIENT)
Dept: ORTHOPEDIC SURGERY | Age: 55
End: 2020-07-14

## 2020-07-14 ENCOUNTER — OFFICE VISIT (OUTPATIENT)
Dept: ORTHOPEDIC SURGERY | Age: 55
End: 2020-07-14
Payer: COMMERCIAL

## 2020-07-14 VITALS — BODY MASS INDEX: 29.39 KG/M2 | HEIGHT: 72 IN | TEMPERATURE: 98.6 F | WEIGHT: 217 LBS

## 2020-07-14 PROCEDURE — L3760 EO ADJ JT PREFAB CUSTOM FIT: HCPCS | Performed by: ORTHOPAEDIC SURGERY

## 2020-07-14 PROCEDURE — 99024 POSTOP FOLLOW-UP VISIT: CPT | Performed by: ORTHOPAEDIC SURGERY

## 2020-07-14 NOTE — PROGRESS NOTES
History:  Yudith Dorman is here for follow up after right distal biceps tendon repair. Surgery date was 6/26/20. Pain is controlled with current analgesics. Medication(s) being used: Prcocet. The patient's pain is rated at 1-2. Post op problems reported:  Numbness around 1st webspace. Physical Examination:  Temp 98.6 °F (37 °C)   Ht 5' 11.5\" (1.816 m)   Wt 217 lb (98.4 kg)   BMI 29.84 kg/m²    Patient is awake, alert, and in no acute distress. EPL / FPL / Interossei intact bilaterally. Sensation intact to light touch median, radial, ulnar nerve distribution. Subjectively decreased in 1st webspace. Right hand warm and well perfused. Assessment:   2 weeks status post right distal biceps tendon repair. Plan:       Procedures    Breg Elbow T-Scope Brace     Patient was prescribed a Breg Elbow T-Scope Brace. The right elbow will require stabilization / immobilization from this semi-rigid / rigid orthosis to improve their function. The orthosis will assist in protecting the affected area, provide functional support and facilitate healing. The prefabricated orthosis was modified in the following manner to provide a customizable fit for the patient at the time of delivery. 1.  Identification of appropriate positioning and alignment of anatomical landmarks. 2.  Trimming of straps and adjustment of frame to fit patient. 3.  Polycentric hinge adjustments in flexion and extension. The patient was educated and fit by a healthcare professional with expert knowledge and specialization in brace application while under the direct supervision of the treating physician. Verbal and written instructions for the use of and application of this item were provided. They were instructed to contact the office immediately should the brace result in increased pain, decreased sensation, increased swelling or worsening of the condition.       PT referral.    The patient may not advance their weight-bearing. No resisted elbow flexion or supination for 6 weeks postop. Ice prn. Refill pain medications as needed. No NSAIDs. Follow up in 4 weeks for evaluation of progress or prn if problems.

## 2020-07-14 NOTE — TELEPHONE ENCOUNTER
7/14/20  [facility]     -  NO PRECERT REQUIRED - PER  DEWEY @ [facility]   REF # [id number]  MP

## 2020-07-15 ENCOUNTER — HOSPITAL ENCOUNTER (OUTPATIENT)
Dept: PHYSICAL THERAPY | Age: 55
Setting detail: THERAPIES SERIES
Discharge: HOME | End: 2020-07-15
Payer: COMMERCIAL

## 2020-07-15 PROCEDURE — 97112 NEUROMUSCULAR REEDUCATION: CPT

## 2020-07-15 PROCEDURE — 97110 THERAPEUTIC EXERCISES: CPT

## 2020-07-15 PROCEDURE — 97161 PT EVAL LOW COMPLEX 20 MIN: CPT

## 2020-07-15 NOTE — PLAN OF CARE
Millie, [address] [facility], [address]  Phone: [phone]   Fax: [phone]                                                     Physical Therapy Certification    Dear Referring Practitioner: Noemy Polanco MD,    We had the pleasure of evaluating the following patient for physical therapy services at [address] [place] [address]. A summary of our findings can be found in the initial assessment below. This includes our plan of care. If you have any questions or concerns regarding these findings, please do not hesitate to contact me at the office phone number checked above. Thank you for the referral.       Physician Signature:_______________________________Date:__________________  By signing above (or electronic signature), therapists plan is approved by physician      Patient: Darrol Brittle   : 1965   MRN: [id number]  Referring Physician: Referring Practitioner: Noemy Polanco MD      Evaluation Date: 7/15/2020      Medical Diagnosis Information:  Diagnosis: X73.001J - Rupture of right distal biceps tendon, subsequent encounter - sx:20   Treatment Diagnosis: decreased elbow/shoulder ROM, decreased elbow/shoulder strength, elbow pain, impaired UE function                                         Insurance information: PT Insurance Information: CIG[employer] / no auth / no copay / Evelin Briones met for     Precautions/ Contra-indications: The patient may not advance their weight-bearing at this time. No resisted elbow flexion or supination for 6 weeks postop. (until 20). Latex Allergy:  [x]NO      []YES  Preferred Language for Healthcare:   [x]English       []other:    SUBJECTIVE: Patient stated complaint: the patient was trying to remove a 10 oz. plastic piece from downspout and felt a large pop in his elbow, and felt his biceps flop and go limp immediately. He underwent surgery on 20 and was in splint until 20. The patient is now 2.5 weeks post-op. He is wearing brace at all times. 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 The patient would like to be able to get back to weight lifting. Relevant Medical History: hx of gout  Functional Outcome: Quick DASH: raw score = 35; dysfunction = 52%    Pain Scale: 3-5/10  Easing factors: rest  Provocative factors: common movement/activation of elbow/biceps, self-care     Type: []Constant   [x]Intermittent  []Radiating []Localized []other:     Numbness/Tingling: dorsum of R hand and posterior aspect of thumb    Occupation/School: IT - working full duty    Living Status/Prior Level of Function: Prior to this injury / incident, pt was independent with ADLs and IADLs and maintains this independence. He cannot lift or weight bear with R arm at this time. OBJECTIVE:   Hand dominance: right    Palpation: +1 TTP distal biceps insertion    Functional Mobility/Transfers: unable to use R arm to push off    Posture: good    Bandages/Dressings/Incisions: Pt's wearing steristrips over Incisions. clean and dry with no S&S of infection.     Dermatomes Normal Abnormal Comments   Top of head (C1) x     Posterior occipital region (C2) x     Side of neck (C3) x     Top of shoulder (C4) x     Lateral deltoid (C5) x     Tip of thumb (C6) x  Slight decrease over dorsum of R hand   Distal middle finger (C7) x     Distal fifth finger (C8) x     Medial forearm (T1) x         Myotomes - SEE NT due to recent surgery Normal Abnormal Comments   Neck flexion (C1-C2) x     Neck sidebending (C3) x     Shoulder elevation (C4) x     Shoulder abduction (C5) x     Elbow flexion/wrist extension (C6)   NT   Elbow extension/wrist flexion (C7)   NT   Thumb abduction (C8) x     Finger abduction (T1) x         Reflexes - NT due to recent surgery Normal Abnormal Comments   C5-6 Biceps      C5-6 Brachioradialis      C7-8 Triceps      [place]s           PROM AROM L R L R   Shoulder IR  56  50   Shoulder ER  74  70   Shoulder Flexion   125  140   Shoulder Abduction   150  NT   Elbow Flexion  118  NT   Elbow Extension  Lack 18  NT   Forearm Supination  NT  NT   Forearm Pronation  NT  NT   Wrist Flexion  NT  NT   Wrist Extension  NT  NT       Strength (0-5) - NT due to recent surgery Left Right    Shoulder Flex  NT   Shoulder Abd  NT   Shoulder ER  NT   Shoulder IR  NT   Biceps  NT   Triceps  NT      NT          Joint mobility: R elbow   []Normal    [x]Hypo   []Hyper    Orthopedic Special Tests: NT due to recent surgery                    [x] Patient history, allergies, meds reviewed. Medical chart reviewed. See intake form. Review Of Systems (ROS):  [x]Performed Review of systems (Integumentary, CardioPulmonary, Neurological) by intake and observation. Intake form has been scanned into medical record. Patient has been instructed to contact their primary care physician regarding ROS issues if not already being addressed at this time.       Co-morbidities/Complexities (which will affect course of rehabilitation):   []None           Arthritic conditions   []Rheumatoid arthritis (M05.9)  []Osteoarthritis (M19.91)   Cardiovascular conditions   []Hypertension (I10)  []Hyperlipidemia (E78.5)  []Angina pectoris (I20)  []Atherosclerosis (I70)   Musculoskeletal conditions   []Disc pathology   []Congenital spine pathologies   [x]surgical intervention  []Osteoporosis (M81.8)  []Osteopenia (M85.8)   Endocrine conditions   []Hypothyroid (E03.9)  []Hyperthyroid Gastrointestinal conditions   []Constipation (W75.84)   Metabolic conditions   []Morbid obesity (E66.01)  []Diabetes type 1(E10.65) or 2 (E11.65)   []Neuropathy (G60.9)     Pulmonary conditions   []Asthma (J45)  []Coughing   []COPD (J44.9)   Psychological Disorders  []Anxiety (F41.9)  []Depression (F32.9)   []Other:   []Other:          Barriers to/and or personal factors that will affect rehab potential: [x]Age  []Sex    []Smoker              [x]Motivation/Lack of Motivation                        []Co-Morbidities              []Cognitive Function, education/learning barriers              []Environmental, home barriers              []profession/work barriers  [x]past PT/medical experience  []other:  Justification: positive influence on potential     Falls Risk Assessment (30 days):   [x] Falls Risk assessed and no intervention required. [] Falls Risk assessed and Patient requires intervention due to being higher risk   TUG score (>12s at risk):     [] Falls education provided, including         ASSESSMENT: The patient is a 53year old male who presents 2.5 weeks s/p R distal biceps repair. He will benefit from skilled PT services to restore forearm/elbow ROM, strength, sensation, reduce swelling/pain, and facilitate normalization of RUE function. The patient will likely require therapy for 12-20 weeks based on MD protocol for rehabilitation.     Functional Impairments   [x]Noted spinal or UE joint hypomobility   []Noted spinal or UE joint hypermobility   [x]Decreased UE functional ROM   [x]Decreased UE functional strength   [x]Abnormal reflexes/sensation/myotomal/dermatomal deficits   [x]Decreased RC/scapular/core strength and neuromuscular control   []other:      Functional Activity Limitations (from functional questionnaire and intake)   [x]Reduced ability to tolerate prolonged functional positions   [x]Reduced ability or difficulty with changes of positions or transfers between positions   [x]Reduced ability to maintain good posture and demonstrate good body mechanics with sitting, bending, and lifting   [] Reduced ability or tolerance with driving and/or computer work   []Reduced ability to sleep   [x]Reduced ability to perform lifting, reaching, carrying tasks   [x]Reduced ability to tolerate impact through UE   []Reduced ability to reach behind back   [x]Reduced ability to  or hold objects   [x]Reduced ability to throw or toss an object   []other:    Participation Restrictions   [x]Reduced participation in self care activities   [x]Reduced participation in home management activities   [x]Reduced participation in work activities   []Reduced participation in social activities. [x]Reduced participation in sport/recreation activities. Classification:   [x]Signs/symptoms consistent with post-surgical status including decreased ROM, strength and function.   []Signs/symptoms consistent with joint sprain/strain   []Signs/symptoms consistent with shoulder impingement   []Signs/symptoms consistent with shoulder/elbow/wrist tendinopathy   []Signs/symptoms consistent with Rotator cuff tear   []Signs/symptoms consistent with labral tear   []Signs/symptoms consistent with postural dysfunction    []Signs/symptoms consistent with Glenohumeral IR Deficit - <45 degrees   []Signs/symptoms consistent with facet dysfunction of cervical/thoracic spine    []Signs/symptoms consistent with pathology which may benefit from Dry needling     []other:     Prognosis/Rehab Potential:      [x]Excellent   []Good    []Fair   []Poor    Tolerance of evaluation/treatment:    [x]Excellent   []Good    []Fair   []Poor    Physical Therapy Evaluation Complexity Justification  [x] A history of present problem with:  [] no personal factors and/or comorbidities that impact the plan of care;  [x]1-2 personal factors and/or comorbidities that impact the plan of care  []3 personal factors and/or comorbidities that impact the plan of care  [x] An examination of body systems using standardized tests and measures addressing any of the following: body structures and functions (impairments), activity limitations, and/or participation restrictions;:  [] a total of 1-2 or more elements   [x] a total of 3 or more elements   [] a total of 4 or more elements   [x] A clinical presentation with:  [x] stable and/or uncomplicated characteristics   [] evolving clinical presentation with changing characteristics  [] unstable and unpredictable characteristics;   [x] Clinical decision making of [x] low, [] moderate, [] high complexity using standardized patient assessment instrument and/or measurable assessment of functional outcome. [x] EVAL (LOW) 53620 (typically 15 minutes face-to-face)  [] EVAL (MOD) 27469 (typically 30 minutes face-to-face)  [] EVAL (HIGH) 42448 (typically 45 minutes face-to-face)  [] RE-EVAL     PLAN:  Frequency/Duration:  1 days per week for 1 Weeks, then 2 days per week for 5 weeks:  INTERVENTIONS:  [x] Therapeutic exercise including: strength training, ROM, for Upper extremity and core   [x]  NMR activation and proprioception for UE, scap and Core   [x] Manual therapy as indicated for shoulder, scapula and spine to include: Dry Needling/IASTM, STM, PROM, Gr I-IV mobilizations, manipulation. [x] Modalities as needed that may include: thermal agents, E-stim, Biofeedback, US, iontophoresis as indicated  [x] Patient education on joint protection, postural re-education, activity modification, progression of HEP. HEP instruction: Access Code: [email or web address]   URL: [email or web address]   Date: 07/15/2020   Prepared by: Sharmila Luis     Exercises   Seated Shoulder Abduction with Bent Elbow - 10 reps - 2 sets - 2x daily - 6x weekly   Seated Shoulder Flexion - 10 reps - 2 sets - 2x daily - 6x weekly   Supported Elbow Flexion Extension PROM - 10 reps - 2 sets - 5 hold - 1x daily - 6x weekly   Elbow Extension PROM - 10 reps - 2 sets - 5 hold - 1x daily - 6x weekly   Seated Scapular Retraction - 10 reps - 2 sets - 1x daily - 6x weekly   Isometric Shoulder Extension at Wall - 10 reps - 1 sets - 5 hold - 1x daily - 6x weekly   Standing Isometric Shoulder Internal Rotation at Doorway - 10 reps - 1 sets - 5 hold - 1x daily - 6x weekly   Isometric Shoulder External Rotation at Wall - 10 reps - 1 sets - 5 hold - 1x daily - 6x weekly   Forearm Supination PROM - 10 reps - 2 sets - 5 hold - 2x daily - 6x weekly   Forearm Pronation PROM - 10 reps - 2 sets - 5 hold - 2x daily - 6x weekly       GOALS:  Patient stated goal: regain strength and mobility  [] Progressing: [] Met: [] Not Met: [] Adjusted    Therapist goals for Patient:   Short Term Goals: To be achieved in: 2 weeks  1. Independent in HEP and progression per patient tolerance, in order to prevent re-injury. [] Progressing: [] Met: [] Not Met: [] Adjusted  2. Patient will have a decrease in pain to <2/10 on average facilitate improvement in movement, function, and ADLs as indicated by Functional Deficits. [] Progressing: [] Met: [] Not Met: [] Adjusted  3. Patient demonstrates understanding of and compliance with precautions and restrictions following surgery. [] Progressing: [] Met: [] Not Met: [] Adjusted    Long Term Goals: To be achieved in: 6 weeks  1. Disability index score of 25% or less for the UEFI or Quick DASH to assist with reaching prior level of function. [] Progressing: [] Met: [] Not Met: [] Adjusted  2. Patient will demonstrate increased AROM to >140deg elbow flexion, 0 deg elbow extension to allow for proper joint functioning as indicated by patients Functional Deficits. [] Progressing: [] Met: [] Not Met: [] Adjusted  3. Patient will demonstrate an increase in Strength to >4/5 elbow flex/ext, forearm sup/pron, wrist flex/ext to allow for proper functional mobility as indicated by patients Functional Deficits. [] Progressing: [] Met: [] Not Met: [] Adjusted  4. Patient will return to functional activities including gripping/lifting >8# to shoulder height 25x without increased symptoms or restriction. [] Progressing: [] Met: [] Not Met: [] Adjusted  5. The patient will decrease pain to 0/10 on average and will tolerate weight bearing and resisted elbow flexion at 5/5 without limitations/pain.   [] Progressing: [] Met: [] Not Met: [] Adjusted     Electronically signed by:  Alfonzo Watts PT,

## 2020-07-22 ENCOUNTER — HOSPITAL ENCOUNTER (OUTPATIENT)
Dept: PHYSICAL THERAPY | Age: 55
Setting detail: THERAPIES SERIES
Discharge: HOME | End: 2020-07-22
Payer: COMMERCIAL

## 2020-07-22 PROCEDURE — 97140 MANUAL THERAPY 1/> REGIONS: CPT | Performed by: PHYSICAL THERAPIST

## 2020-07-22 PROCEDURE — 97110 THERAPEUTIC EXERCISES: CPT | Performed by: PHYSICAL THERAPIST

## 2020-07-22 NOTE — FLOWSHEET NOTE
[facility] - Outpatient Physical Therapy    Physical Therapy Daily Treatment Note  Date:  2020    Patient Name:  Albania King    :  1965  MRN: [id number]     Medical/Treatment Diagnosis Information:  · Diagnosis: S46.211D - Rupture of right distal biceps tendon, subsequent encounter - sx:20  · Treatment Diagnosis: decreased elbow/shoulder ROM, decreased elbow/shoulder strength, elbow pain, impaired UE function  Insurance/Certification information:  PT Insurance Information: CIGNA / no Poonamvenice Damon / no copay / Sean Gayle met for   Physician Information:  Referring Practitioner: Kenan Espinoza MD  Plan of care signed (Y/N): []  Yes [x]  No     Progress Report: []  Yes  [x]  No     Date Range for reporting period:  Beginning 7/15  Ending TBD    Progress report due (10 Rx/or 30 days whichever is less): 98I     Recertification due (POC duration/ or 90 days whichever is less): poc    Visit # Insurance Allowable Date Range (if applicable)    30 41     Latex Allergy:  [x]NO      []YES  Preferred Language for Healthcare:   [x]English       []other:    Functional Outcome: Quick DASH: raw score = 35; dysfunction = 52%    Pain level:  0/10     SUBJECTIVE:  Pt. Denies pain in his arm at this time. Pt. Notes that he continues to have some difficulty with passive forearm supination. Pt. Notes that his wrist is feeling stiff. Pt. Reports compliance with HEP. OBJECTIVE: See eval    RESTRICTIONS/PRECAUTIONS: The patient may not advance their weight-bearing at this time. No resisted elbow flexion or supination for 6 weeks postop. (until 20). SEE BEASLEY PROTOCOL FOR DISTAL BICEPS TENDON REPAIR IN MEDIA.     Exercises/Interventions:     Therapeutic Exercises (55658)  Resistance Sets Reps Notes   gripping  1 30    pulley npv      Elbow AAROM flex/ext  5\" 15    Scapular retraction  3 10    Forearm supination PROM  5\" 15    Wrist flex/ext AROM  2 10    Wrist extensor stretch npv      Wrist flexor stretch npv      Wrist flex 2# 3 10 Forearm neutral   Wrist ext 2# 3 10 Forearm pronated                                                    Neuromuscular Re-ed (41537) Therapeutic Activities (40382)       submax isos:  GH ext  GH IR  GH ER  GH flex  GH abd     5 x 5'' ea                                       Manual Intervention (52118)       Shoulder flex, abd, IR/ER PROM  4'     Elbow flex/ext PROM  3'     Forearm supination/pronation PROM  3'     Wrist flex/ext PROM  3'                     Patient Education & HEP:  - Patient educated on the focus of skilled physical therapy services and plan of care, including: diagnosis, prognosis, treatment goals & options. HEP instruction: Access Code: [email or web address]   URL: [email or web address]   Date: 07/15/2020   Prepared by: Vic Wylie     Exercises   · Seated Shoulder Abduction with Bent Elbow - 10 reps - 2 sets - 2x daily - 6x weekly   · Seated Shoulder Flexion - 10 reps - 2 sets - 2x daily - 6x weekly   · Supported Elbow Flexion Extension PROM - 10 reps - 2 sets - 5 hold - 1x daily - 6x weekly   · Elbow Extension PROM - 10 reps - 2 sets - 5 hold - 1x daily - 6x weekly   · Seated Scapular Retraction - 10 reps - 2 sets - 1x daily - 6x weekly   · Isometric Shoulder Extension at Wall - 10 reps - 1 sets - 5 hold - 1x daily - 6x weekly   · Standing Isometric Shoulder Internal Rotation at Doorway - 10 reps - 1 sets - 5 hold - 1x daily - 6x weekly   · Isometric Shoulder External Rotation at Wall - 10 reps - 1 sets - 5 hold - 1x daily - 6x weekly   · Forearm Supination PROM - 10 reps - 2 sets - 5 hold - 2x daily - 6x weekly   Forearm Pronation PROM - 10 reps - 2 sets - 5 hold - 2x daily - 6x weekly   - All patient questions were answered    Therapeutic Exercise and NMR EXR  [x] (52801) Provided verbal/tactile cueing for activities related to strengthening, flexibility, endurance, ROM for improvements in  [] LE / Lumbar: LE, proximal hip, and core control with self care, mobility, lifting, ambulation. [x] UE / Cervical: cervical, postural, scapular, scapulothoracic and UE control with self care, reaching, carrying, lifting, house/yardwork, driving, computer work. [x] (29068) Provided verbal/tactile cueing for activities related to improving balance, coordination, kinesthetic sense, posture, motor skill, proprioception to assist with   [] LE / lumbar: LE, proximal hip, and core control in self care, mobility, lifting, ambulation and eccentric single leg control. [x] UE / cervical: cervical, scapular, scapulothoracic and UE control with self care, reaching, carrying, lifting, house/yardwork, driving, computer work.      NMR and Therapeutic Activities:    [x] (29518 or 39734) Provided verbal/tactile cueing for activities related to improving balance, coordination, kinesthetic sense, posture, motor skill, proprioception and motor activation to allow for proper function of   [] LE: / Lumbar core, proximal hip and LE with self care and ADLs  [x] UE / Cervical: cervical, postural, scapular, scapulothoracic and UE control with self care, carrying, lifting, driving, computer work.   [] (87220) Gait Re-education- Provided training and instruction to the patient for proper LE, core and proximal hip recruitment and positioning and eccentric body weight control with ambulation re-education including up and down stairs     Home Exercise Program:    [x] (51155) Reviewed/Progressed HEP activities related to strengthening, flexibility, endurance, ROM of   [] LE / Lumbar: core, proximal hip and LE for functional self-care, mobility, lifting and ambulation/stair navigation   [x] UE / Cervical: cervical, postural, scapular, scapulothoracic and UE control with self care, reaching, carrying, lifting, house/yardwork, driving, computer work  [x] (84975)Reviewed/Progressed HEP activities related to improving balance, coordination, kinesthetic sense, posture, motor skill, proprioception of   [] LE: core, proximal hip and LE for self care, mobility, lifting, and ambulation/stair navigation    [x] UE / Cervical: cervical, postural,  scapular, scapulothoracic and UE control with self care, reaching, carrying, lifting, house/yardwork, driving, computer work    Manual Treatments:  PROM / STM / Oscillations-Mobs:  G-I, II, III, IV (PA's, Inf., Post.)  [] (90025) Provided manual therapy to mobilize LE, proximal hip and/or LS spine soft tissue/joints for the purpose of modulating pain, promoting relaxation,  increasing ROM, reducing/eliminating soft tissue swelling/inflammation/restriction, improving soft tissue extensibility and allowing for proper ROM for normal function with   [] LE / lumbar: self care, mobility, lifting and ambulation. [] UE / Cervical: self care, reaching, carrying, lifting, house/yardwork, driving, computer work. Modalities:  [] (39033) Vasopneumatic compression: Utilized vasopneumatic compression to decrease edema / swelling for the purpose of improving mobility and quad tone / recruitment which will allow for increased overall function including but not limited to self-care, transfers, ambulation, and ascending / descending stairs. Modalities: Consider MOC    Charges:  Timed Code Treatment Minutes: 41   Total Treatment Minutes: 41     [] EVAL - LOW (01977)   [] EVAL - MOD (60972)  [] EVAL - HIGH (05802)  [] RE-EVAL (12924)  [x] TE (83200) x 2     [] Ionto  [] NMR (79139) x      [] Vaso  [x] Manual (71088) x 1     [] Ultrasound  [] TA x       [] Mech Traction (35417)  [] Gait Training x     [] ES (un) (06437):   [] Aquatic therapy x   [] Other:   [] Group:     GOALS:  Patient stated goal: regain strength and mobility  []? Progressing: []? Met: []? Not Met: []? Adjusted     Therapist goals for Patient:   Short Term Goals: To be achieved in: 2 weeks  1. Independent in HEP and progression per patient tolerance, in order to prevent re-injury. []? Progressing: []? Met: []?  Not Met: []? follow up with physician  [] Other:     Persisting Functional Limitations/Impairments:  []Sleeping []Sitting               []Standing [x]Transfers        []Walking []Kneeling               []Stairs []Squatting / bending   [x]ADLs [x]Reaching  [x]Lifting  [x]Housework  [x]Driving [x]Job related tasks  [x]Sports/Recreation []Other:      ASSESSMENT:    Treatment/Activity Tolerance:  [x] Patient tolerated treatment well [] Patient limited by fatigue  [] Patient limited by pain  [] Patient limited by other medical complications  [x] Other: Pt. Tolerated therapy today without complaints. Manual PROM performed to maintain shoulder mobility and to progress elbow, forearm and wrist mobility as tolerated. Pt. Fatigued by addition of wrist PREs in protected motion. Pt. Requires continued progression of post-op protocol in order to safely restore functional strength. Prognosis: [x] Good [] Fair  [] Poor    Patient Requires Follow-up: [x] Yes  [] No    PLAN: POC: PT 1x / week for 1 weeks, then 2x/week for 5 weeks. Assess response to HEP. ASSESS FOREARM SUPINATION/PRONATION, WRIST FLEX/EXTENSION. AAROM ELBOW/FOREARM EXERCISE. ISOMETRICS ABDUCTION/FLEXION. , MANUAL FOR ROM. [x] Continue per plan of care [] Alter current plan (see comments)  [] Plan of care initiated [] Hold pending MD visit [] Discharge    Electronically signed by: Eagle Patrick PT, DPT    Note: If patient does not return for scheduled/ recommended follow up visits, this note will serve as a discharge from care along with most recent update on progress.

## 2020-07-28 ENCOUNTER — HOSPITAL ENCOUNTER (OUTPATIENT)
Dept: PHYSICAL THERAPY | Age: 55
Setting detail: THERAPIES SERIES
Discharge: HOME | End: 2020-07-28
Payer: COMMERCIAL

## 2020-07-28 PROCEDURE — 97110 THERAPEUTIC EXERCISES: CPT

## 2020-07-28 PROCEDURE — 97140 MANUAL THERAPY 1/> REGIONS: CPT

## 2020-07-28 PROCEDURE — 97530 THERAPEUTIC ACTIVITIES: CPT

## 2020-07-28 NOTE — FLOWSHEET NOTE
[facility] - Outpatient Physical Therapy    Physical Therapy Daily Treatment Note  Date:  2020    Patient Name:  Darrol Brittle    :  1965  MRN: [id number]     Medical/Treatment Diagnosis Information:  · Diagnosis: S46.211D - Rupture of right distal biceps tendon, subsequent encounter - sx:20  · Treatment Diagnosis: decreased elbow/shoulder ROM, decreased elbow/shoulder strength, elbow pain, impaired UE function  Insurance/Certification information:  PT Insurance Information: CIGNA / no Donata Punter / no copay / Evelin Briones met for   Physician Information:  Referring Practitioner: Noemy Polanco MD  Plan of care signed (Y/N): []  Yes [x]  No     Progress Report: []  Yes  [x]  No     Date Range for reporting period:  Beginning 7/15  Ending TBD    Progress report due (10 Rx/or 30 days whichever is less): 85Z     Recertification due (POC duration/ or 90 days whichever is less): poc    Visit # Insurance Allowable Date Range (if applicable)    30 6146     Latex Allergy:  [x]NO      []YES  Preferred Language for Healthcare:   [x]English       []other:    Functional Outcome: Quick DASH: raw score = 35; dysfunction = 52%    Pain level:  0/10     SUBJECTIVE:  Concerned about swelling in wrist, stiffness limiting mobility. OBJECTIVE: See eval    RESTRICTIONS/PRECAUTIONS: The patient may not advance their weight-bearing at this time. No resisted elbow flexion or supination for 6 weeks postop. (until 20). SEE BEASLEY PROTOCOL FOR DISTAL BICEPS TENDON REPAIR IN MEDIA.     Exercises/Interventions:     Therapeutic Exercises (02785) 20' Resistance Sets Reps Notes   gripping  1 30    pulley  10\" 10    Elbow AAROM flex/ext  5\" 15    Scapular retraction  3 10    Forearm supination PROM  5\" 15    Wrist flex/ext AROM  2 10    Wrist extensor stretch npv      Wrist flexor stretch npv      Wrist flex 2# 3 10 Forearm neutral   Wrist ext 2# 3 10 Forearm pronated Neuromuscular Re-ed (57111) Therapeutic Activities (84362)         Resume next visit     5 x 5'' ea           7/28: fitting & education on wearing edema glove and tensoshape for hand swelling, ice and elevate hand/arm at home for swelling control   8'                          Manual Intervention (74794) 11'       Resume next visit      Resume next visit  3'     Forearm supination/pronation PROM  3'     Wrist flex/ext PROM  8'                     Patient Education & HEP:  - Patient educated on the focus of skilled physical therapy services and plan of care, including: diagnosis, prognosis, treatment goals & options. HEP instruction: Access Code: [email or web address]   URL: [email or web address]   Date: 07/15/2020   Prepared by: Delicia Sullivan     Exercises   · Seated Shoulder Abduction with Bent Elbow - 10 reps - 2 sets - 2x daily - 6x weekly   · Seated Shoulder Flexion - 10 reps - 2 sets - 2x daily - 6x weekly   · Supported Elbow Flexion Extension PROM - 10 reps - 2 sets - 5 hold - 1x daily - 6x weekly   · Elbow Extension PROM - 10 reps - 2 sets - 5 hold - 1x daily - 6x weekly   · Seated Scapular Retraction - 10 reps - 2 sets - 1x daily - 6x weekly   · Isometric Shoulder Extension at Wall - 10 reps - 1 sets - 5 hold - 1x daily - 6x weekly   · Standing Isometric Shoulder Internal Rotation at Doorway - 10 reps - 1 sets - 5 hold - 1x daily - 6x weekly   · Isometric Shoulder External Rotation at Wall - 10 reps - 1 sets - 5 hold - 1x daily - 6x weekly   · Forearm Supination PROM - 10 reps - 2 sets - 5 hold - 2x daily - 6x weekly   Forearm Pronation PROM - 10 reps - 2 sets - 5 hold - 2x daily - 6x weekly   - All patient questions were answered    Therapeutic Exercise and NMR EXR  [x] (87604) Provided verbal/tactile cueing for activities related to strengthening, flexibility, endurance, ROM for improvements in  [] LE / Lumbar: LE, proximal hip, and core control with self care, mobility, lifting, ambulation.   [x] UE / Cervical: cervical, postural, scapular, scapulothoracic and UE control with self care, reaching, carrying, lifting, house/yardwork, driving, computer work. [x] (01381) Provided verbal/tactile cueing for activities related to improving balance, coordination, kinesthetic sense, posture, motor skill, proprioception to assist with   [] LE / lumbar: LE, proximal hip, and core control in self care, mobility, lifting, ambulation and eccentric single leg control. [x] UE / cervical: cervical, scapular, scapulothoracic and UE control with self care, reaching, carrying, lifting, house/yardwork, driving, computer work.      NMR and Therapeutic Activities:    [x] (71079 or 67020) Provided verbal/tactile cueing for activities related to improving balance, coordination, kinesthetic sense, posture, motor skill, proprioception and motor activation to allow for proper function of   [] LE: / Lumbar core, proximal hip and LE with self care and ADLs  [x] UE / Cervical: cervical, postural, scapular, scapulothoracic and UE control with self care, carrying, lifting, driving, computer work.   [] (11985) Gait Re-education- Provided training and instruction to the patient for proper LE, core and proximal hip recruitment and positioning and eccentric body weight control with ambulation re-education including up and down stairs     Home Exercise Program:    [x] (17906) Reviewed/Progressed HEP activities related to strengthening, flexibility, endurance, ROM of   [] LE / Lumbar: core, proximal hip and LE for functional self-care, mobility, lifting and ambulation/stair navigation   [x] UE / Cervical: cervical, postural, scapular, scapulothoracic and UE control with self care, reaching, carrying, lifting, house/yardwork, driving, computer work  [x] (64885)Reviewed/Progressed HEP activities related to improving balance, coordination, kinesthetic sense, posture, motor skill, proprioception of   [] LE: core, proximal hip and LE for self care, mobility, lifting, and ambulation/stair navigation    [x] UE / Cervical: cervical, postural,  scapular, scapulothoracic and UE control with self care, reaching, carrying, lifting, house/yardwork, driving, computer work    Manual Treatments:  PROM / STM / Oscillations-Mobs:  G-I, II, III, IV (PA's, Inf., Post.)  [] (34944) Provided manual therapy to mobilize LE, proximal hip and/or LS spine soft tissue/joints for the purpose of modulating pain, promoting relaxation,  increasing ROM, reducing/eliminating soft tissue swelling/inflammation/restriction, improving soft tissue extensibility and allowing for proper ROM for normal function with   [] LE / lumbar: self care, mobility, lifting and ambulation. [] UE / Cervical: self care, reaching, carrying, lifting, house/yardwork, driving, computer work. Modalities:  [] (68538) Vasopneumatic compression: Utilized vasopneumatic compression to decrease edema / swelling for the purpose of improving mobility and quad tone / recruitment which will allow for increased overall function including but not limited to self-care, transfers, ambulation, and ascending / descending stairs. Modalities: Consider [facility]    Charges:  Timed Code Treatment Minutes: 39   Total Treatment Minutes: 40     [] EVAL - LOW (62206)   [] EVAL - MOD (56734)  [] EVAL - HIGH (89518)  [] RE-EVAL (28940)  [x] TE (29841) x 1     [] Ionto  [] NMR (94009) x      [] Vaso  [x] Manual (53450) x 1     [] Ultrasound  [x] TA x  1     [] Mech Traction (75599)  [] Gait Training x     [] ES (un) (01135):   [] Aquatic therapy x   [] Other:   [] Group:     GOALS:  Patient stated goal: regain strength and mobility  []? Progressing: []? Met: []? Not Met: []? Adjusted     Therapist goals for Patient:   Short Term Goals: To be achieved in: 2 weeks  1. Independent in HEP and progression per patient tolerance, in order to prevent re-injury. []? Progressing: []? Met: []? Not Met: []? Adjusted  2.  Patient will have a decrease in pain to <2/10 on average facilitate improvement in movement, function, and ADLs as indicated by Functional Deficits. []? Progressing: []? Met: []? Not Met: []? Adjusted  3. Patient demonstrates understanding of and compliance with precautions and restrictions following surgery. []? Progressing: []? Met: []? Not Met: []? Adjusted     Long Term Goals: To be achieved in: 6 weeks  1. Disability index score of 25% or less for the UEFI or Quick DASH to assist with reaching prior level of function. []? Progressing: []? Met: []? Not Met: []? Adjusted  2. Patient will demonstrate increased AROM to >140deg elbow flexion, 0 deg elbow extension to allow for proper joint functioning as indicated by patients Functional Deficits. []? Progressing: []? Met: []? Not Met: []? Adjusted  3. Patient will demonstrate an increase in Strength to >4/5 elbow flex/ext, forearm sup/pron, wrist flex/ext to allow for proper functional mobility as indicated by patients Functional Deficits. []? Progressing: []? Met: []? Not Met: []? Adjusted  4. Patient will return to functional activities including gripping/lifting >8# to shoulder height 25x without increased symptoms or restriction. []? Progressing: []? Met: []? Not Met: []? Adjusted  5. The patient will decrease pain to 0/10 on average and will tolerate weight bearing and resisted elbow flexion at 5/5 without limitations/pain. []? Progressing: []? Met: []? Not Met: []? Adjusted         Overall Progression Towards Functional goals/ Treatment Progress Update:  [] Patient is progressing as expected towards functional goals listed. [] Progression is slowed due to complexities/Impairments listed. [] Progression has been slowed due to co-morbidities.   [x] Plan just implemented, too soon to assess goals progression <30days   [] Goals require adjustment due to lack of progress  [] Patient is not progressing as expected and requires additional follow up with physician  [] Other:     Persisting Functional Limitations/Impairments:  []Sleeping []Sitting               []Standing [x]Transfers        []Walking []Kneeling               []Stairs []Squatting / bending   [x]ADLs [x]Reaching  [x]Lifting  [x]Housework  [x]Driving [x]Job related tasks  [x]Sports/Recreation []Other:      ASSESSMENT:  Noted significant wrist joint hypomobility due to swelling and stiffness. Applied hand compression glove and tensoshape to assist in decreasing swelling to achieve more functional movement. Discussed with pt importance of ice and elevation as pt has not been utilizing techniques to minimize swelling in hand. Exercise and stretching are limited at this time at elbow due to protocol restrictions. Continue to advance strength, motion and function as tolerated to promote increased functional mobility to perform daily and work related tasks without restrictions. Treatment/Activity Tolerance:  [x] Patient tolerated treatment well [] Patient limited by fatigue  [] Patient limited by pain  [] Patient limited by other medical complications  [] Other:     Prognosis: [x] Good [] Fair  [] Poor    Patient Requires Follow-up: [x] Yes  [] No    PLAN: POC: PT 1x / week for 1 weeks, then 2x/week for 5 weeks. Assess response to HEP. ASSESS FOREARM SUPINATION/PRONATION, WRIST FLEX/EXTENSION. AAROM ELBOW/FOREARM EXERCISE. ISOMETRICS ABDUCTION/FLEXION. , MANUAL FOR ROM. [x] Continue per plan of care [] Alter current plan (see comments)  [] Plan of care initiated [] Hold pending MD visit [] Discharge    Electronically signed by: Andrzej Macias    Note: If patient does not return for scheduled/ recommended follow up visits, this note will serve as a discharge from care along with most recent update on progress.

## 2020-07-31 ENCOUNTER — HOSPITAL ENCOUNTER (OUTPATIENT)
Dept: PHYSICAL THERAPY | Age: 55
Setting detail: THERAPIES SERIES
Discharge: HOME | End: 2020-07-31
Payer: COMMERCIAL

## 2020-07-31 PROCEDURE — 97530 THERAPEUTIC ACTIVITIES: CPT | Performed by: PHYSICAL THERAPIST

## 2020-07-31 PROCEDURE — 97140 MANUAL THERAPY 1/> REGIONS: CPT | Performed by: PHYSICAL THERAPIST

## 2020-07-31 PROCEDURE — 97110 THERAPEUTIC EXERCISES: CPT | Performed by: PHYSICAL THERAPIST

## 2020-07-31 NOTE — FLOWSHEET NOTE
[facility] - Outpatient Physical Therapy    Physical Therapy Daily Treatment Note  Date:  2020    Patient Name:  Mehreen Llamas    :  1965  MRN: [id number]     Medical/Treatment Diagnosis Information:  · Diagnosis: S46.211D - Rupture of right distal biceps tendon, subsequent encounter - sx:20  · Treatment Diagnosis: decreased elbow/shoulder ROM, decreased elbow/shoulder strength, elbow pain, impaired UE function  Insurance/Certification information:  PT Insurance Information: CIGNA / no Mary Anne Lala / no brian / Garth Brice met for   Physician Information:  Referring Practitioner: Ana Colon MD  Plan of care signed (Y/N): []  Yes [x]  No     Progress Report: []  Yes  [x]  No     Date Range for reporting period:  Beginning 7/15  Ending TBD    Progress report due (10 Rx/or 30 days whichever is less): 99U     Recertification due (POC duration/ or 90 days whichever is less): poc    Visit # Insurance Allowable Date Range (if applicable)   607     Latex Allergy:  [x]NO      []YES  Preferred Language for Healthcare:   [x]English       []other:    Functional Outcome: Quick DASH: raw score = 35; dysfunction = 52%    Pain level:  0/10     SUBJECTIVE:  Pt. Reports that his elbow is feeling a little better. He is able to do more daily activities around the house. Pt. States that the compression sleeve seems to be helping with the swelling. Pt. Reports compliance with HEP. OBJECTIVE: See eval    RESTRICTIONS/PRECAUTIONS: The patient may not advance their weight-bearing at this time. No resisted elbow flexion or supination for 6 weeks postop. (until 20). SEE BEASLEY PROTOCOL FOR DISTAL BICEPS TENDON REPAIR IN MEDIA.     Exercises/Interventions:     Therapeutic Exercises (36007) 31' Resistance Sets Reps Notes   gripping  1 30    pulley scaption 10\" 10    Elbow AAROM flex/ext  5\" 15    Scapular retraction ^npv 3 10    Shoulder extension npv      Forearm supination PROM  5\" 15    Wrist flex/ext AROM  2 10    Wrist extensor stretch  3 30\" Forearm pronated   Wrist flexor stretch  3 30\" Forearm pronated   Wrist flex 2# 3 10 Forearm neutral   Wrist ext 2# 3 10 Forearm pronated   Triceps band blue 3 10                                              Neuromuscular Re-ed (62697) Therapeutic Activities (44549)       submax isos:    GH ext  GH IR  GH ER  GH flex  GH abd                                            Manual Intervention (48078) 10'       Shoulder flex, abd, IR/ER PROM       Elbow flex/ext PROM  3'     Forearm supination/pronation PROM  3'     Wrist flex/ext PROM  4'                     Patient Education & HEP:  - Patient educated on the focus of skilled physical therapy services and plan of care, including: diagnosis, prognosis, treatment goals & options. HEP instruction: Access Code: [email or web address]   URL: [email or web address]   Date: 07/15/2020   Prepared by: Nikita Ferro     Exercises   · Seated Shoulder Abduction with Bent Elbow - 10 reps - 2 sets - 2x daily - 6x weekly   · Seated Shoulder Flexion - 10 reps - 2 sets - 2x daily - 6x weekly   · Supported Elbow Flexion Extension PROM - 10 reps - 2 sets - 5 hold - 1x daily - 6x weekly   · Elbow Extension PROM - 10 reps - 2 sets - 5 hold - 1x daily - 6x weekly   · Seated Scapular Retraction - 10 reps - 2 sets - 1x daily - 6x weekly   · Isometric Shoulder Extension at Wall - 10 reps - 1 sets - 5 hold - 1x daily - 6x weekly   · Standing Isometric Shoulder Internal Rotation at Doorway - 10 reps - 1 sets - 5 hold - 1x daily - 6x weekly   · Isometric Shoulder External Rotation at Wall - 10 reps - 1 sets - 5 hold - 1x daily - 6x weekly   · Forearm Supination PROM - 10 reps - 2 sets - 5 hold - 2x daily - 6x weekly   Forearm Pronation PROM - 10 reps - 2 sets - 5 hold - 2x daily - 6x weekly   - All patient questions were answered    Therapeutic Exercise and NMR EXR  [x] (20246) Provided verbal/tactile cueing for activities related to strengthening, flexibility, endurance, ROM for improvements in  [] LE / Lumbar: LE, proximal hip, and core control with self care, mobility, lifting, ambulation. [x] UE / Cervical: cervical, postural, scapular, scapulothoracic and UE control with self care, reaching, carrying, lifting, house/yardwork, driving, computer work. [x] (87159) Provided verbal/tactile cueing for activities related to improving balance, coordination, kinesthetic sense, posture, motor skill, proprioception to assist with   [] LE / lumbar: LE, proximal hip, and core control in self care, mobility, lifting, ambulation and eccentric single leg control. [x] UE / cervical: cervical, scapular, scapulothoracic and UE control with self care, reaching, carrying, lifting, house/yardwork, driving, computer work.      NMR and Therapeutic Activities:    [x] (05845 or 56874) Provided verbal/tactile cueing for activities related to improving balance, coordination, kinesthetic sense, posture, motor skill, proprioception and motor activation to allow for proper function of   [] LE: / Lumbar core, proximal hip and LE with self care and ADLs  [x] UE / Cervical: cervical, postural, scapular, scapulothoracic and UE control with self care, carrying, lifting, driving, computer work.   [] (19266) Gait Re-education- Provided training and instruction to the patient for proper LE, core and proximal hip recruitment and positioning and eccentric body weight control with ambulation re-education including up and down stairs     Home Exercise Program:    [x] (46278) Reviewed/Progressed HEP activities related to strengthening, flexibility, endurance, ROM of   [] LE / Lumbar: core, proximal hip and LE for functional self-care, mobility, lifting and ambulation/stair navigation   [x] UE / Cervical: cervical, postural, scapular, scapulothoracic and UE control with self care, reaching, carrying, lifting, house/yardwork, driving, computer work  [x] (19790)Reviewed/Progressed HEP activities progression per patient tolerance, in order to prevent re-injury. []? Progressing: []? Met: []? Not Met: []? Adjusted  2. Patient will have a decrease in pain to <2/10 on average facilitate improvement in movement, function, and ADLs as indicated by Functional Deficits. []? Progressing: []? Met: []? Not Met: []? Adjusted  3. Patient demonstrates understanding of and compliance with precautions and restrictions following surgery. []? Progressing: []? Met: []? Not Met: []? Adjusted     Long Term Goals: To be achieved in: 6 weeks  1. Disability index score of 25% or less for the UEFI or Quick DASH to assist with reaching prior level of function. []? Progressing: []? Met: []? Not Met: []? Adjusted  2. Patient will demonstrate increased AROM to >140deg elbow flexion, 0 deg elbow extension to allow for proper joint functioning as indicated by patients Functional Deficits. []? Progressing: []? Met: []? Not Met: []? Adjusted  3. Patient will demonstrate an increase in Strength to >4/5 elbow flex/ext, forearm sup/pron, wrist flex/ext to allow for proper functional mobility as indicated by patients Functional Deficits. []? Progressing: []? Met: []? Not Met: []? Adjusted  4. Patient will return to functional activities including gripping/lifting >8# to shoulder height 25x without increased symptoms or restriction. []? Progressing: []? Met: []? Not Met: []? Adjusted  5. The patient will decrease pain to 0/10 on average and will tolerate weight bearing and resisted elbow flexion at 5/5 without limitations/pain. []? Progressing: []? Met: []? Not Met: []? Adjusted         Overall Progression Towards Functional goals/ Treatment Progress Update:  [] Patient is progressing as expected towards functional goals listed. [] Progression is slowed due to complexities/Impairments listed. [] Progression has been slowed due to co-morbidities.   [x] Plan just implemented, too soon to assess goals progression <30days   [] Goals require adjustment due to lack of progress  [] Patient is not progressing as expected and requires additional follow up with physician  [] Other:     Persisting Functional Limitations/Impairments:  []Sleeping []Sitting               []Standing [x]Transfers        []Walking []Kneeling               []Stairs []Squatting / bending   [x]ADLs [x]Reaching  [x]Lifting  [x]Housework  [x]Driving [x]Job related tasks  [x]Sports/Recreation []Other:      ASSESSMENT: pt. Tolerated therapy today without complaints. Pt. Demonstrates functional shoulder motion on pulleys. Initiated wrist flexor and extensor stretch to promote wrist movement and elbow extension. Pt. With decreased effusion this date. Able to initiate triceps band without issue. Pt. Continues to have some stiffness limiting passive motion into supination. Pt. Educated in scar tissue massage. Pt. Requires continued progression of post-op protocol in order to safely restore functional strength and motion as able. Treatment/Activity Tolerance:  [x] Patient tolerated treatment well [] Patient limited by fatigue  [] Patient limited by pain  [] Patient limited by other medical complications  [] Other:     Prognosis: [x] Good [] Fair  [] Poor    Patient Requires Follow-up: [x] Yes  [] No    PLAN: POC: PT 1x / week for 1 weeks, then 2x/week for 5 weeks. Assess response to HEP. [x] Continue per plan of care [] Alter current plan (see comments)  [] Plan of care initiated [] Hold pending MD visit [] Discharge    Electronically signed by: Karl Augustin PT    Note: If patient does not return for scheduled/ recommended follow up visits, this note will serve as a discharge from care along with most recent update on progress.

## 2020-08-04 ENCOUNTER — HOSPITAL ENCOUNTER (OUTPATIENT)
Dept: PHYSICAL THERAPY | Age: 55
Setting detail: THERAPIES SERIES
Discharge: HOME | End: 2020-08-04
Payer: COMMERCIAL

## 2020-08-04 PROCEDURE — 97110 THERAPEUTIC EXERCISES: CPT | Performed by: PHYSICAL THERAPIST

## 2020-08-04 PROCEDURE — 97140 MANUAL THERAPY 1/> REGIONS: CPT | Performed by: PHYSICAL THERAPIST

## 2020-08-04 NOTE — FLOWSHEET NOTE
[facility] - Outpatient Physical Therapy    Physical Therapy Daily Treatment Note  Date:  2020    Patient Name:  Mehreen Llamas    :  1965  MRN: [id number]     Medical/Treatment Diagnosis Information:  · Diagnosis: S46.211D - Rupture of right distal biceps tendon, subsequent encounter - sx:20  · Treatment Diagnosis: decreased elbow/shoulder ROM, decreased elbow/shoulder strength, elbow pain, impaired UE function  Insurance/Certification information:  PT Insurance Information: CIG[employer] / no Mary Anne Hessk / no copay / Garth Brice met for   Physician Information:  Referring Practitioner: Ana Colon MD  Plan of care signed (Y/N): []  Yes [x]  No     Progress Report: []  Yes  [x]  No     Date Range for reporting period:  Beginning 7/15  Ending TBD    Progress report due (10 Rx/or 30 days whichever is less): 29B     Recertification due (POC duration/ or 90 days whichever is less): poc    Visit # Insurance Allowable Date Range (if applicable)    30 4867     Latex Allergy:  [x]NO      []YES  Preferred Language for Healthcare:   [x]English       []other:    Functional Outcome: Quick DASH: raw score = 35; dysfunction = 52%    Pain level:  0/10     SUBJECTIVE: Pt. Reports that his elbow is feeling better. Pt. Reports compliance with HEP. OBJECTIVE:     Elbow ext/flex AROM: 0-7-132    RESTRICTIONS/PRECAUTIONS: The patient may not advance their weight-bearing at this time. No resisted elbow flexion or supination for 6 weeks postop. (until 20). SEE BEASLEY PROTOCOL FOR DISTAL BICEPS TENDON REPAIR IN MEDIA.     Exercises/Interventions:     Therapeutic Exercises (75582) 28' Resistance Sets Reps Notes   gripping  1 30    pulley scaption 10\" 10    Elbow AROM flex/ext  5\" 15    Scapular retraction blue 3 10    Shoulder extension blue 3 10    Forearm supination PROM  5\" 15    Wrist flex/ext AROM  2 10    Wrist extensor stretch  3 30\" Forearm pronated   Wrist flexor stretch  3 30\" Forearm pronated Wrist flex 3# 3 10 Forearm neutral   Wrist ext 3# 3 10 Forearm pronated   Triceps band blue 3 10                                              Neuromuscular Re-ed (69048) Therapeutic Activities (54629) x4'                Bicep isometics - man resistance 45, 90, 120 deg 5\" 5ea                                Manual Intervention (65503) 10'       Shoulder flex, abd, IR/ER PROM       Elbow flex/ext PROM  3'     Forearm supination/pronation PROM  4'     Wrist flex/ext PROM  3'                     Patient Education & HEP:  - Patient educated on the focus of skilled physical therapy services and plan of care, including: diagnosis, prognosis, treatment goals & options. HEP instruction: Access Code: [email or web address]   URL: [email or web address]   Date: 07/15/2020   Prepared by: Jennifer Rae     Exercises   · Seated Shoulder Abduction with Bent Elbow - 10 reps - 2 sets - 2x daily - 6x weekly   · Seated Shoulder Flexion - 10 reps - 2 sets - 2x daily - 6x weekly   · Supported Elbow Flexion Extension PROM - 10 reps - 2 sets - 5 hold - 1x daily - 6x weekly   · Elbow Extension PROM - 10 reps - 2 sets - 5 hold - 1x daily - 6x weekly   · Seated Scapular Retraction - 10 reps - 2 sets - 1x daily - 6x weekly   · Isometric Shoulder Extension at Wall - 10 reps - 1 sets - 5 hold - 1x daily - 6x weekly   · Standing Isometric Shoulder Internal Rotation at Doorway - 10 reps - 1 sets - 5 hold - 1x daily - 6x weekly   · Isometric Shoulder External Rotation at Wall - 10 reps - 1 sets - 5 hold - 1x daily - 6x weekly   · Forearm Supination PROM - 10 reps - 2 sets - 5 hold - 2x daily - 6x weekly   Forearm Pronation PROM - 10 reps - 2 sets - 5 hold - 2x daily - 6x weekly   - All patient questions were answered    Therapeutic Exercise and NMR EXR  [x] (26947) Provided verbal/tactile cueing for activities related to strengthening, flexibility, endurance, ROM for improvements in  [] LE / Lumbar: LE, proximal hip, and core control with self care, mobility, lifting, ambulation. [x] UE / Cervical: cervical, postural, scapular, scapulothoracic and UE control with self care, reaching, carrying, lifting, house/yardwork, driving, computer work. [x] (43450) Provided verbal/tactile cueing for activities related to improving balance, coordination, kinesthetic sense, posture, motor skill, proprioception to assist with   [] LE / lumbar: LE, proximal hip, and core control in self care, mobility, lifting, ambulation and eccentric single leg control. [x] UE / cervical: cervical, scapular, scapulothoracic and UE control with self care, reaching, carrying, lifting, house/yardwork, driving, computer work.      NMR and Therapeutic Activities:    [x] (92821 or 41234) Provided verbal/tactile cueing for activities related to improving balance, coordination, kinesthetic sense, posture, motor skill, proprioception and motor activation to allow for proper function of   [] LE: / Lumbar core, proximal hip and LE with self care and ADLs  [x] UE / Cervical: cervical, postural, scapular, scapulothoracic and UE control with self care, carrying, lifting, driving, computer work.   [] (43305) Gait Re-education- Provided training and instruction to the patient for proper LE, core and proximal hip recruitment and positioning and eccentric body weight control with ambulation re-education including up and down stairs     Home Exercise Program:    [x] (62560) Reviewed/Progressed HEP activities related to strengthening, flexibility, endurance, ROM of   [] LE / Lumbar: core, proximal hip and LE for functional self-care, mobility, lifting and ambulation/stair navigation   [x] UE / Cervical: cervical, postural, scapular, scapulothoracic and UE control with self care, reaching, carrying, lifting, house/yardwork, driving, computer work  [x] (98010)Reviewed/Progressed HEP activities related to improving balance, coordination, kinesthetic sense, posture, motor skill, proprioception of   [] LE: core, proximal hip and LE for self care, mobility, lifting, and ambulation/stair navigation    [x] UE / Cervical: cervical, postural,  scapular, scapulothoracic and UE control with self care, reaching, carrying, lifting, house/yardwork, driving, computer work    Manual Treatments:  PROM / STM / Oscillations-Mobs:  G-I, II, III, IV (PA's, Inf., Post.)  [] (75944) Provided manual therapy to mobilize LE, proximal hip and/or LS spine soft tissue/joints for the purpose of modulating pain, promoting relaxation,  increasing ROM, reducing/eliminating soft tissue swelling/inflammation/restriction, improving soft tissue extensibility and allowing for proper ROM for normal function with   [] LE / lumbar: self care, mobility, lifting and ambulation. [] UE / Cervical: self care, reaching, carrying, lifting, house/yardwork, driving, computer work. Modalities:  [] (62738) Vasopneumatic compression: Utilized vasopneumatic compression to decrease edema / swelling for the purpose of improving mobility and quad tone / recruitment which will allow for increased overall function including but not limited to self-care, transfers, ambulation, and ascending / descending stairs. Modalities: Consider MO    Charges:  Timed Code Treatment Minutes: 42   Total Treatment Minutes: 44     [] EVAL - LOW (89389)   [] EVAL - MOD (69099)  [] EVAL - HIGH (33570)  [] RE-EVAL (40533)  [x] TE (38875) x 2     [] Ionto  [] NMR (38074) x      [] Vaso  [x] Manual (73166) x 1     [] Ultrasound  [] TA x       [] Mech Traction (45205)  [] Gait Training x     [] ES (un) (19716):   [] Aquatic therapy x   [] Other:   [] Group:     GOALS:  Patient stated goal: regain strength and mobility  []? Progressing: []? Met: []? Not Met: []? Adjusted     Therapist goals for Patient:   Short Term Goals: To be achieved in: 2 weeks  1. Independent in HEP and progression per patient tolerance, in order to prevent re-injury. []? Progressing: []? Met: []?  Not Met: []? Adjusted  2. Patient will have a decrease in pain to <2/10 on average facilitate improvement in movement, function, and ADLs as indicated by Functional Deficits. []? Progressing: []? Met: []? Not Met: []? Adjusted  3. Patient demonstrates understanding of and compliance with precautions and restrictions following surgery. []? Progressing: []? Met: []? Not Met: []? Adjusted     Long Term Goals: To be achieved in: 6 weeks  1. Disability index score of 25% or less for the UEFI or Quick DASH to assist with reaching prior level of function. []? Progressing: []? Met: []? Not Met: []? Adjusted  2. Patient will demonstrate increased AROM to >140deg elbow flexion, 0 deg elbow extension to allow for proper joint functioning as indicated by patients Functional Deficits. []? Progressing: []? Met: []? Not Met: []? Adjusted  3. Patient will demonstrate an increase in Strength to >4/5 elbow flex/ext, forearm sup/pron, wrist flex/ext to allow for proper functional mobility as indicated by patients Functional Deficits. []? Progressing: []? Met: []? Not Met: []? Adjusted  4. Patient will return to functional activities including gripping/lifting >8# to shoulder height 25x without increased symptoms or restriction. []? Progressing: []? Met: []? Not Met: []? Adjusted  5. The patient will decrease pain to 0/10 on average and will tolerate weight bearing and resisted elbow flexion at 5/5 without limitations/pain. []? Progressing: []? Met: []? Not Met: []? Adjusted         Overall Progression Towards Functional goals/ Treatment Progress Update:  [] Patient is progressing as expected towards functional goals listed. [] Progression is slowed due to complexities/Impairments listed. [] Progression has been slowed due to co-morbidities.   [x] Plan just implemented, too soon to assess goals progression <30days   [] Goals require adjustment due to lack of progress  [] Patient is not progressing as expected and requires additional follow up with physician  [] Other:     Persisting Functional Limitations/Impairments:  []Sleeping []Sitting               []Standing [x]Transfers        []Walking []Kneeling               []Stairs []Squatting / bending   [x]ADLs [x]Reaching  [x]Lifting  [x]Housework  [x]Driving [x]Job related tasks  [x]Sports/Recreation []Other:      ASSESSMENT: Pt. Tolerated therapy today without complaints. Pt. Demonstrates noted improvements in elbow AROM. Able to add light resistance to shoulder exercises. Initiated light biceps isometrics at multiple angles without issue. Pt. Requires continued progression of post-op protocol in order to restore functional strength. Treatment/Activity Tolerance:  [x] Patient tolerated treatment well [] Patient limited by fatigue  [] Patient limited by pain  [] Patient limited by other medical complications  [] Other:     Prognosis: [x] Good [] Fair  [] Poor    Patient Requires Follow-up: [x] Yes  [] No    PLAN: POC: PT 1x / week for 1 weeks, then 2x/week for 5 weeks. Assess response to HEP. [x] Continue per plan of care [] Alter current plan (see comments)  [] Plan of care initiated [] Hold pending MD visit [] Discharge    Electronically signed by: Mitchel Tran PT    Note: If patient does not return for scheduled/ recommended follow up visits, this note will serve as a discharge from care along with most recent update on progress.

## 2020-08-07 ENCOUNTER — HOSPITAL ENCOUNTER (OUTPATIENT)
Dept: PHYSICAL THERAPY | Age: 55
Setting detail: THERAPIES SERIES
Discharge: HOME | End: 2020-08-07
Payer: COMMERCIAL

## 2020-08-07 PROCEDURE — 97110 THERAPEUTIC EXERCISES: CPT | Performed by: PHYSICAL THERAPIST

## 2020-08-07 PROCEDURE — 97140 MANUAL THERAPY 1/> REGIONS: CPT | Performed by: PHYSICAL THERAPIST

## 2020-08-07 NOTE — FLOWSHEET NOTE
[facility] - Outpatient Physical Therapy    Physical Therapy Daily Treatment Note  Date:  2020    Patient Name:  Mike Pruett    :  1965  MRN: [id number]     Medical/Treatment Diagnosis Information:  · Diagnosis: S46.211D - Rupture of right distal biceps tendon, subsequent encounter - sx:20  · Treatment Diagnosis: decreased elbow/shoulder ROM, decreased elbow/shoulder strength, elbow pain, impaired UE function  Insurance/Certification information:  PT Insurance Information: CIGNA / no [place] / no copay / Megan Juwan met for   Physician Information:  Referring Practitioner: Nannette Mckeon MD  Plan of care signed (Y/N): []  Yes [x]  No     Progress Report: []  Yes  [x]  No     Date Range for reporting period:  Beginning 7/15  Ending TBD    Progress report due (10 Rx/or 30 days whichever is less): 96C     Recertification due (POC duration/ or 90 days whichever is less): poc    Visit # Insurance Allowable Date Range (if applicable)    30 8456     Latex Allergy:  [x]NO      []YES  Preferred Language for Healthcare:   [x]English       []other:    Functional Outcome: Quick DASH: raw score = 35; dysfunction = 52%    Pain level:  0/10     SUBJECTIVE:  Pt. Reports that his elbow is feeling good and he has no new complaints at this time. Pt. Reports compliance with HEP. OBJECTIVE:     Elbow ext/flex AROM: 0-7-132    RESTRICTIONS/PRECAUTIONS: The patient may not advance their weight-bearing at this time. No resisted elbow flexion or supination for 6 weeks postop. (until 20). SEE BEASLEY PROTOCOL FOR DISTAL BICEPS TENDON REPAIR IN MEDIA.     Exercises/Interventions:     Therapeutic Exercises (38931) 31' Resistance Sets Reps Notes   gripping     pulley scaption 10\" 10    Elbow AROM flex/ext  3 10 1 set neutral, pronated, supinated   Scapular retraction blue 3 10    Shoulder extension blue 3 10    Forearm supination PROM  5\" 15    Wrist flex/ext AROM     Wrist extensor stretch  3 30\" and ADLs  [x] UE / Cervical: cervical, postural, scapular, scapulothoracic and UE control with self care, carrying, lifting, driving, computer work.   [] (76559) Gait Re-education- Provided training and instruction to the patient for proper LE, core and proximal hip recruitment and positioning and eccentric body weight control with ambulation re-education including up and down stairs     Home Exercise Program:    [x] (89752) Reviewed/Progressed HEP activities related to strengthening, flexibility, endurance, ROM of   [] LE / Lumbar: core, proximal hip and LE for functional self-care, mobility, lifting and ambulation/stair navigation   [x] UE / Cervical: cervical, postural, scapular, scapulothoracic and UE control with self care, reaching, carrying, lifting, house/yardwork, driving, computer work  [x] (83404)Reviewed/Progressed HEP activities related to improving balance, coordination, kinesthetic sense, posture, motor skill, proprioception of   [] LE: core, proximal hip and LE for self care, mobility, lifting, and ambulation/stair navigation    [x] UE / Cervical: cervical, postural,  scapular, scapulothoracic and UE control with self care, reaching, carrying, lifting, house/yardwork, driving, computer work    Manual Treatments:  PROM / STM / Oscillations-Mobs:  G-I, II, III, IV (PA's, Inf., Post.)  [] (99898) Provided manual therapy to mobilize LE, proximal hip and/or LS spine soft tissue/joints for the purpose of modulating pain, promoting relaxation,  increasing ROM, reducing/eliminating soft tissue swelling/inflammation/restriction, improving soft tissue extensibility and allowing for proper ROM for normal function with   [] LE / lumbar: self care, mobility, lifting and ambulation. [] UE / Cervical: self care, reaching, carrying, lifting, house/yardwork, driving, computer work.      Modalities:  [] (36293) Vasopneumatic compression: Utilized vasopneumatic compression to decrease edema / swelling for the purpose of improving mobility and quad tone / recruitment which will allow for increased overall function including but not limited to self-care, transfers, ambulation, and ascending / descending stairs. Modalities: Consider MOC    Charges:  Timed Code Treatment Minutes: 44   Total Treatment Minutes: 44     [] EVAL - LOW (51369)   [] EVAL - MOD (99037)  [] EVAL - HIGH (21287)  [] RE-EVAL (23279)  [x] TE (84516) x 2     [] Ionto  [] NMR (53126) x      [] Vaso  [x] Manual (42208) x 1     [] Ultrasound  [] TA x       [] Mech Traction (11279)  [] Gait Training x     [] ES (un) (63000):   [] Aquatic therapy x   [] Other:   [] Group:     GOALS:  Patient stated goal: regain strength and mobility  []? Progressing: []? Met: []? Not Met: []? Adjusted     Therapist goals for Patient:   Short Term Goals: To be achieved in: 2 weeks  1. Independent in HEP and progression per patient tolerance, in order to prevent re-injury. []? Progressing: []? Met: []? Not Met: []? Adjusted  2. Patient will have a decrease in pain to <2/10 on average facilitate improvement in movement, function, and ADLs as indicated by Functional Deficits. []? Progressing: []? Met: []? Not Met: []? Adjusted  3. Patient demonstrates understanding of and compliance with precautions and restrictions following surgery. []? Progressing: []? Met: []? Not Met: []? Adjusted     Long Term Goals: To be achieved in: 6 weeks  1. Disability index score of 25% or less for the UEFI or Quick DASH to assist with reaching prior level of function. []? Progressing: []? Met: []? Not Met: []? Adjusted  2. Patient will demonstrate increased AROM to >140deg elbow flexion, 0 deg elbow extension to allow for proper joint functioning as indicated by patients Functional Deficits. []? Progressing: []? Met: []? Not Met: []? Adjusted  3.  Patient will demonstrate an increase in Strength to >4/5 elbow flex/ext, forearm sup/pron, wrist flex/ext to allow for proper functional mobility as indicated by patients Functional Deficits. []? Progressing: []? Met: []? Not Met: []? Adjusted  4. Patient will return to functional activities including gripping/lifting >8# to shoulder height 25x without increased symptoms or restriction. []? Progressing: []? Met: []? Not Met: []? Adjusted  5. The patient will decrease pain to 0/10 on average and will tolerate weight bearing and resisted elbow flexion at 5/5 without limitations/pain. []? Progressing: []? Met: []? Not Met: []? Adjusted         Overall Progression Towards Functional goals/ Treatment Progress Update:  [] Patient is progressing as expected towards functional goals listed. [] Progression is slowed due to complexities/Impairments listed. [] Progression has been slowed due to co-morbidities. [x] Plan just implemented, too soon to assess goals progression <30days   [] Goals require adjustment due to lack of progress  [] Patient is not progressing as expected and requires additional follow up with physician  [] Other:     Persisting Functional Limitations/Impairments:  []Sleeping []Sitting               []Standing [x]Transfers        []Walking []Kneeling               []Stairs []Squatting / bending   [x]ADLs [x]Reaching  [x]Lifting  [x]Housework  [x]Driving [x]Job related tasks  [x]Sports/Recreation []Other:      ASSESSMENT: Pt. Tolerated therapy today without complaints. Able to progress light shoulder strengthening per protocol without issue. Pt. Slowly improving in elbow and forearm ROM. Discussed with pt. Beginning to wean from brace. Pt. Demonstrates improved control with wrist PREs. Continued with biceps multi-angle isometrics without issue. Pt. Requires continued progression of post-op protocol in order to safely restore functional strength to allow pt. To return to PLOF.      Treatment/Activity Tolerance:  [x] Patient tolerated treatment well [] Patient limited by fatigue  [] Patient limited by pain  [] Patient limited by other medical complications  [] Other:     Prognosis: [x] Good [] Fair  [] Poor    Patient Requires Follow-up: [x] Yes  [] No    PLAN: POC: PT 1x / week for 1 weeks, then 2x/week for 5 weeks. Assess response to HEP. [x] Continue per plan of care [] Alter current plan (see comments)  [] Plan of care initiated [] Hold pending MD visit [] Discharge    Electronically signed by: Zoltan Corey PT    Note: If patient does not return for scheduled/ recommended follow up visits, this note will serve as a discharge from care along with most recent update on progress.

## 2020-08-11 ENCOUNTER — HOSPITAL ENCOUNTER (OUTPATIENT)
Dept: PHYSICAL THERAPY | Age: 55
Setting detail: THERAPIES SERIES
Discharge: HOME | End: 2020-08-11
Payer: COMMERCIAL

## 2020-08-11 PROCEDURE — 97140 MANUAL THERAPY 1/> REGIONS: CPT | Performed by: PHYSICAL THERAPIST

## 2020-08-11 PROCEDURE — 97110 THERAPEUTIC EXERCISES: CPT | Performed by: PHYSICAL THERAPIST

## 2020-08-11 NOTE — FLOWSHEET NOTE
[facility] - Outpatient Physical Therapy    Physical Therapy Daily Treatment Note  Date:  2020    Patient Name:  Melanie Pulido    :  1965  MRN: [id number]     Medical/Treatment Diagnosis Information:  · Diagnosis: S46.211D - Rupture of right distal biceps tendon, subsequent encounter - sx:20  · Treatment Diagnosis: decreased elbow/shoulder ROM, decreased elbow/shoulder strength, elbow pain, impaired UE function  Insurance/Certification information:  PT Insurance Information: CIGNA / no Sharrell Mantis / no copay / Rafiq Rosario met for   Physician Information:  Referring Practitioner: Tahir Forbes MD  Plan of care signed (Y/N): []  Yes [x]  No     Progress Report: []  Yes  [x]  No     Date Range for reporting period:  Beginning 7/15  Ending TBD    Progress report due (10 Rx/or 30 days whichever is less): 32K     Recertification due (POC duration/ or 90 days whichever is less): poc    Visit # Insurance Allowable Date Range (if applicable)    30 1380     Latex Allergy:  [x]NO      []YES  Preferred Language for Healthcare:   [x]English       []other:    Functional Outcome: Quick DASH: raw score = 35; dysfunction = 52%    Pain level:  0/10     SUBJECTIVE: pt. Reports that overall he is feeling good. His arm is a little sore but not limiting him. Pt. Was able to mow his grass with the brace on without issue. Pt. Reports compliance with HEP. OBJECTIVE:     Elbow ext/flex AROM: 0-7-132    RESTRICTIONS/PRECAUTIONS: distal biceps repair sx:20 SEE GALE PROTOCOL FOR DISTAL BICEPS TENDON REPAIR IN MEDIA.     Exercises/Interventions:     Therapeutic Exercises (69394) 28' Resistance Sets Reps Notes   gripping     UBE fwd 3'     pulley scaption 10\" 10    Elbow AROM flex/ext  3 10 1 set neutral, pronated, supinated   Scapular retraction blue 3 10    Shoulder extension blue 3 10    Forearm supination PROM  5\" 15    Wrist flex/ext AROM     Wrist extensor stretch  3 30\" Forearm pronated Wrist flexor stretch  3 30\" Forearm pronated   Wrist flex 3# 3 10 Forearm supinated   Wrist ext 3# 3 10 Forearm pronated   Triceps band blue 3 10    Band ER blue 3 10    Band IR blue 3 10                                Neuromuscular Re-ed (60759) Therapeutic Activities (82431) x6'                Bicep isometics - man resistance 30, 45, 90, 120 deg 5\" 5ea    Supination isometric - man resistance Forearm neutral 5\" 5                         Manual Intervention (08575) 8'       Shoulder flex, abd, IR/ER PROM       Elbow flex/ext PROM  2'     Forearm supination/pronation PROM  3'     Wrist flex/ext PROM  3'                     Patient Education & HEP:  -pt. Instructed to wean from brace in controlled environments  8/7: pt. Declined written instructions for home program    Therapeutic Exercise and NMR EXR  [x] (93039) Provided verbal/tactile cueing for activities related to strengthening, flexibility, endurance, ROM for improvements in  [] LE / Lumbar: LE, proximal hip, and core control with self care, mobility, lifting, ambulation. [x] UE / Cervical: cervical, postural, scapular, scapulothoracic and UE control with self care, reaching, carrying, lifting, house/yardwork, driving, computer work. [x] (43134) Provided verbal/tactile cueing for activities related to improving balance, coordination, kinesthetic sense, posture, motor skill, proprioception to assist with   [] LE / lumbar: LE, proximal hip, and core control in self care, mobility, lifting, ambulation and eccentric single leg control. [x] UE / cervical: cervical, scapular, scapulothoracic and UE control with self care, reaching, carrying, lifting, house/yardwork, driving, computer work.      NMR and Therapeutic Activities:    [x] (14828 or 22426) Provided verbal/tactile cueing for activities related to improving balance, coordination, kinesthetic sense, posture, motor skill, proprioception and motor activation to allow for proper function of   [] LE: / Lumbar core, proximal hip and LE with self care and ADLs  [x] UE / Cervical: cervical, postural, scapular, scapulothoracic and UE control with self care, carrying, lifting, driving, computer work.   [] (15806) Gait Re-education- Provided training and instruction to the patient for proper LE, core and proximal hip recruitment and positioning and eccentric body weight control with ambulation re-education including up and down stairs     Home Exercise Program:    [x] (92764) Reviewed/Progressed HEP activities related to strengthening, flexibility, endurance, ROM of   [] LE / Lumbar: core, proximal hip and LE for functional self-care, mobility, lifting and ambulation/stair navigation   [x] UE / Cervical: cervical, postural, scapular, scapulothoracic and UE control with self care, reaching, carrying, lifting, house/yardwork, driving, computer work  [x] (03437)Reviewed/Progressed HEP activities related to improving balance, coordination, kinesthetic sense, posture, motor skill, proprioception of   [] LE: core, proximal hip and LE for self care, mobility, lifting, and ambulation/stair navigation    [x] UE / Cervical: cervical, postural,  scapular, scapulothoracic and UE control with self care, reaching, carrying, lifting, house/yardwork, driving, computer work    Manual Treatments:  PROM / STM / Oscillations-Mobs:  G-I, II, III, IV (PA's, Inf., Post.)  [] (56651) Provided manual therapy to mobilize LE, proximal hip and/or LS spine soft tissue/joints for the purpose of modulating pain, promoting relaxation,  increasing ROM, reducing/eliminating soft tissue swelling/inflammation/restriction, improving soft tissue extensibility and allowing for proper ROM for normal function with   [] LE / lumbar: self care, mobility, lifting and ambulation. [] UE / Cervical: self care, reaching, carrying, lifting, house/yardwork, driving, computer work.      Modalities:  [] (84927) Vasopneumatic compression: Utilized vasopneumatic compression to decrease edema / swelling for the purpose of improving mobility and quad tone / recruitment which will allow for increased overall function including but not limited to self-care, transfers, ambulation, and ascending / descending stairs. Modalities: Consider MOC    Charges:  Timed Code Treatment Minutes: 42   Total Treatment Minutes: 45     [] EVAL - LOW (54380)   [] EVAL - MOD (89544)  [] EVAL - HIGH (45433)  [] RE-EVAL (19659)  [x] TE (63441) x 2     [] Ionto  [] NMR (50205) x      [] Vaso  [x] Manual (03346) x 1     [] Ultrasound  [] TA x       [] Mech Traction (09964)  [] Gait Training x     [] ES (un) (19857):   [] Aquatic therapy x   [] Other:   [] Group:     GOALS:  Patient stated goal: regain strength and mobility  []? Progressing: []? Met: []? Not Met: []? Adjusted     Therapist goals for Patient:   Short Term Goals: To be achieved in: 2 weeks  1. Independent in HEP and progression per patient tolerance, in order to prevent re-injury. []? Progressing: []? Met: []? Not Met: []? Adjusted  2. Patient will have a decrease in pain to <2/10 on average facilitate improvement in movement, function, and ADLs as indicated by Functional Deficits. []? Progressing: []? Met: []? Not Met: []? Adjusted  3. Patient demonstrates understanding of and compliance with precautions and restrictions following surgery. []? Progressing: []? Met: []? Not Met: []? Adjusted     Long Term Goals: To be achieved in: 6 weeks  1. Disability index score of 25% or less for the UEFI or Quick DASH to assist with reaching prior level of function. []? Progressing: []? Met: []? Not Met: []? Adjusted  2. Patient will demonstrate increased AROM to >140deg elbow flexion, 0 deg elbow extension to allow for proper joint functioning as indicated by patients Functional Deficits. []? Progressing: []? Met: []? Not Met: []? Adjusted  3.  Patient will demonstrate an increase in Strength to >4/5 elbow flex/ext, forearm sup/pron, complications  [] Other:     Prognosis: [x] Good [] Fair  [] Poor    Patient Requires Follow-up: [x] Yes  [] No    PLAN: POC: PT 1x / week for 1 weeks, then 2x/week for 5 weeks. Assess response to HEP. [x] Continue per plan of care [] Alter current plan (see comments)  [] Plan of care initiated [] Hold pending MD visit [] Discharge    Electronically signed by: Zenaida Casas PT    Note: If patient does not return for scheduled/ recommended follow up visits, this note will serve as a discharge from care along with most recent update on progress.

## 2020-08-13 ENCOUNTER — HOSPITAL ENCOUNTER (OUTPATIENT)
Dept: PHYSICAL THERAPY | Age: 55
Setting detail: THERAPIES SERIES
Discharge: HOME | End: 2020-08-13
Payer: COMMERCIAL

## 2020-08-13 PROCEDURE — 97110 THERAPEUTIC EXERCISES: CPT

## 2020-08-13 PROCEDURE — 97140 MANUAL THERAPY 1/> REGIONS: CPT

## 2020-08-13 NOTE — FLOWSHEET NOTE
[facility] - Outpatient Physical Therapy    Physical Therapy Daily Treatment Note  Date:  2020    Patient Name:  Mary Balderrama    :  1965  MRN: [id number]     Medical/Treatment Diagnosis Information:  · Diagnosis: S46.211D - Rupture of right distal biceps tendon, subsequent encounter - sx:20  · Treatment Diagnosis: decreased elbow/shoulder ROM, decreased elbow/shoulder strength, elbow pain, impaired UE function  Insurance/Certification information:  PT Insurance Information: CIGNA / no Tommye Cape / no copay / Shaun Lofts met for   Physician Information:  Referring Practitioner: Destiny Smith MD  Plan of care signed (Y/N): []  Yes [x]  No     Progress Report: []  Yes  [x]  No     Date Range for reporting period:  Beginning 7/15  Ending TBD    Progress report due (10 Rx/or 30 days whichever is less): 32B     Recertification due (POC duration/ or 90 days whichever is less): poc    Visit # Insurance Allowable Date Range (if applicable)   9225     Latex Allergy:  [x]NO      []YES  Preferred Language for Healthcare:   [x]English       []other:    Functional Outcome: Quick DASH: raw score = 35; dysfunction = 52%    Pain level:  0/10     SUBJECTIVE: Feels pretty good this morning, no complaints. Needs to leave a few minutes early to take daughter to soccer practice. OBJECTIVE:     Elbow ext/flex AROM: 0-7-132    RESTRICTIONS/PRECAUTIONS: distal biceps repair sx:20 SEE BEASLEY PROTOCOL FOR DISTAL BICEPS TENDON REPAIR IN MEDIA.     Exercises/Interventions:     Therapeutic Exercises (99420) 28' Resistance Sets Reps Notes        UBE fwd 3'     Pulley  Resume next visit    Elbow AROM flex/ext  3 10 1 set neutral, pronated, supinated   Scapular retraction blue 3 10    Shoulder extension blue 3 10    Forearm supination PROM  5\" 15         Wrist extensor stretch  3 30\" Forearm pronated   Wrist flexor stretch  3 30\" Forearm pronated   Wrist flex 3# 3 10 Forearm supinated   Wrist ext 3# 3 10 Forearm pronated   Triceps band blue 3 10    Band ER blue 3 10    Band IR blue 3 10                                Neuromuscular Re-ed (46222) Therapeutic Activities (39701) x6'                Bicep isometics - man resistance 30, 45, 90, 120 deg 5\" 5ea    Supination isometric - man resistance- resume next visit                         Manual Intervention (13532) 8'       Shoulder flex, abd, IR/ER PROM       Elbow flex/ext PROM  2'     Forearm supination/pronation PROM  3'     Wrist flex/ext PROM  3'                     Patient Education & HEP:  -pt. Instructed to wean from brace in controlled environments  8/7: pt. Declined written instructions for home program    Therapeutic Exercise and NMR EXR  [x] (18310) Provided verbal/tactile cueing for activities related to strengthening, flexibility, endurance, ROM for improvements in  [] LE / Lumbar: LE, proximal hip, and core control with self care, mobility, lifting, ambulation. [x] UE / Cervical: cervical, postural, scapular, scapulothoracic and UE control with self care, reaching, carrying, lifting, house/yardwork, driving, computer work. [x] (21391) Provided verbal/tactile cueing for activities related to improving balance, coordination, kinesthetic sense, posture, motor skill, proprioception to assist with   [] LE / lumbar: LE, proximal hip, and core control in self care, mobility, lifting, ambulation and eccentric single leg control. [x] UE / cervical: cervical, scapular, scapulothoracic and UE control with self care, reaching, carrying, lifting, house/yardwork, driving, computer work.      NMR and Therapeutic Activities:    [x] (63324 or 44115) Provided verbal/tactile cueing for activities related to improving balance, coordination, kinesthetic sense, posture, motor skill, proprioception and motor activation to allow for proper function of   [] LE: / Lumbar core, proximal hip and LE with self care and ADLs  [x] UE / Cervical: cervical, postural, scapular, scapulothoracic and UE control with self care, carrying, lifting, driving, computer work.   [] (23771) Gait Re-education- Provided training and instruction to the patient for proper LE, core and proximal hip recruitment and positioning and eccentric body weight control with ambulation re-education including up and down stairs     Home Exercise Program:    [x] (30439) Reviewed/Progressed HEP activities related to strengthening, flexibility, endurance, ROM of   [] LE / Lumbar: core, proximal hip and LE for functional self-care, mobility, lifting and ambulation/stair navigation   [x] UE / Cervical: cervical, postural, scapular, scapulothoracic and UE control with self care, reaching, carrying, lifting, house/yardwork, driving, computer work  [x] (17158)Reviewed/Progressed HEP activities related to improving balance, coordination, kinesthetic sense, posture, motor skill, proprioception of   [] LE: core, proximal hip and LE for self care, mobility, lifting, and ambulation/stair navigation    [x] UE / Cervical: cervical, postural,  scapular, scapulothoracic and UE control with self care, reaching, carrying, lifting, house/yardwork, driving, computer work    Manual Treatments:  PROM / STM / Oscillations-Mobs:  G-I, II, III, IV (PA's, Inf., Post.)  [] (56076) Provided manual therapy to mobilize LE, proximal hip and/or LS spine soft tissue/joints for the purpose of modulating pain, promoting relaxation,  increasing ROM, reducing/eliminating soft tissue swelling/inflammation/restriction, improving soft tissue extensibility and allowing for proper ROM for normal function with   [] LE / lumbar: self care, mobility, lifting and ambulation. [] UE / Cervical: self care, reaching, carrying, lifting, house/yardwork, driving, computer work.      Modalities:  [] (07921) Vasopneumatic compression: Utilized vasopneumatic compression to decrease edema / swelling for the purpose of improving mobility and quad tone / Deficits. []? Progressing: []? Met: []? Not Met: []? Adjusted  4. Patient will return to functional activities including gripping/lifting >8# to shoulder height 25x without increased symptoms or restriction. []? Progressing: []? Met: []? Not Met: []? Adjusted  5. The patient will decrease pain to 0/10 on average and will tolerate weight bearing and resisted elbow flexion at 5/5 without limitations/pain. []? Progressing: []? Met: []? Not Met: []? Adjusted         Overall Progression Towards Functional goals/ Treatment Progress Update:  [] Patient is progressing as expected towards functional goals listed. [] Progression is slowed due to complexities/Impairments listed. [] Progression has been slowed due to co-morbidities. [x] Plan just implemented, too soon to assess goals progression <30days   [] Goals require adjustment due to lack of progress  [] Patient is not progressing as expected and requires additional follow up with physician  [] Other:     Persisting Functional Limitations/Impairments:  []Sleeping []Sitting               []Standing [x]Transfers        []Walking []Kneeling               []Stairs []Squatting / bending   [x]ADLs [x]Reaching  [x]Lifting  [x]Housework  [x]Driving [x]Job related tasks  [x]Sports/Recreation []Other:      ASSESSMENT: Pt performs resistance exercises without significant difficulty and no pain but fatigues and is challenged by upgrades. Pt continues to be restricted in exercises and activity due to protocol and general weakness and limitations in achieving full ROM. continue to upgrade exercises as able to progress strength, motion, function and return to full daily functional tasks without restrictions.      Treatment/Activity Tolerance:  [x] Patient tolerated treatment well [] Patient limited by fatigue  [] Patient limited by pain  [] Patient limited by other medical complications  [] Other:     Prognosis: [x] Good [] Fair  [] Poor    Patient Requires Follow-up: [x]

## 2020-08-18 ENCOUNTER — HOSPITAL ENCOUNTER (OUTPATIENT)
Dept: PHYSICAL THERAPY | Age: 55
Setting detail: THERAPIES SERIES
Discharge: HOME | End: 2020-08-18
Payer: COMMERCIAL

## 2020-08-18 ENCOUNTER — OFFICE VISIT (OUTPATIENT)
Dept: ORTHOPEDIC SURGERY | Age: 55
End: 2020-08-18

## 2020-08-18 VITALS — BODY MASS INDEX: 28.99 KG/M2 | TEMPERATURE: 97.2 F | WEIGHT: 214 LBS | HEIGHT: 72 IN

## 2020-08-18 PROCEDURE — 97140 MANUAL THERAPY 1/> REGIONS: CPT | Performed by: PHYSICAL THERAPIST

## 2020-08-18 PROCEDURE — 99024 POSTOP FOLLOW-UP VISIT: CPT | Performed by: ORTHOPAEDIC SURGERY

## 2020-08-18 PROCEDURE — 97110 THERAPEUTIC EXERCISES: CPT | Performed by: PHYSICAL THERAPIST

## 2020-08-18 NOTE — FLOWSHEET NOTE
[facility] - Outpatient Physical Therapy    Physical Therapy Daily Treatment Note  Date:  2020    Patient Name:  Yudith Dorman    :  1965  MRN: [id number]     Medical/Treatment Diagnosis Information:  · Diagnosis: S46.211D - Rupture of right distal biceps tendon, subsequent encounter - sx:20  · Treatment Diagnosis: decreased elbow/shoulder ROM, decreased elbow/shoulder strength, elbow pain, impaired UE function  Insurance/Certification information:  PT Insurance Information: CIG[employer] / no Esdras Gordillo / no copay / Darcus Side met for   Physician Information:  Referring Practitioner: Shae Kwong MD  Plan of care signed (Y/N): []  Yes [x]  No     Progress Report: []  Yes  [x]  No     Date Range for reporting period:  Beginning 7/15  Ending TBD    Progress report due (10 Rx/or 30 days whichever is less): 93S NPV    Recertification due (POC duration/ or 90 days whichever is less): poc NPV    Visit # Insurance Allowable Date Range (if applicable)    30 9981     Latex Allergy:  [x]NO      []YES  Preferred Language for Healthcare:   [x]English       []other:    Functional Outcome: Quick DASH: raw score = 35; dysfunction = 52%    Pain level:  2/10     SUBJECTIVE: Pt. Reports that his elbow is a little more sore after doing yardwork over the weekend. The soreness was worst  and keeps progressively improving. Most of the soreness is located lateral epicondyle and biceps muscle belly. Pt. Reports compliance with HEP. OBJECTIVE:     Elbow ext/flex AROM: 0-7-132    RESTRICTIONS/PRECAUTIONS: distal biceps repair sx:20 SEE BEASLEY PROTOCOL FOR DISTAL BICEPS TENDON REPAIR IN MEDIA.     Exercises/Interventions:     Therapeutic Exercises (81910) 25' Resistance Sets Reps Notes        UBE fwd 3'     Pulley scaption 10\" 10    Elbow AROM flex/ext  3 10 1 set neutral, pronated, supinated   Scapular retraction blue 3 10    Shoulder extension blue 3 10    Forearm supination PROM  5\" 15 Wrist extensor stretch  3 30\" Forearm pronated   Wrist flexor stretch  3 30\" Forearm pronated   Wrist flex 3# 3 10 Forearm supinated   Wrist ext 3# 3 10 Forearm pronated   Triceps band purple 3 10    biceps Light resistance npv      Band ER blue 3 10    Band IR blue 3 10                                Neuromuscular Re-ed (78647) Therapeutic Activities (24829) x6'                Bicep isometics - man resistance 30, 45, 90, 120 deg 5\" 5ea    Supination isometric - man resistance Forearm neutral 5\" x2 5                         Manual Intervention (04070) 8'       Shoulder flex, abd, IR/ER PROM       Elbow flex/ext PROM  2'     Forearm supination/pronation PROM  3'     Wrist flex/ext PROM  1'     STM common extensor tendon  2'              Patient Education & HEP:  -pt. Instructed to wean from brace in controlled environments  8/7: pt. Declined written instructions for home program    Therapeutic Exercise and NMR EXR  [x] (72605) Provided verbal/tactile cueing for activities related to strengthening, flexibility, endurance, ROM for improvements in  [] LE / Lumbar: LE, proximal hip, and core control with self care, mobility, lifting, ambulation. [x] UE / Cervical: cervical, postural, scapular, scapulothoracic and UE control with self care, reaching, carrying, lifting, house/yardwork, driving, computer work. [x] (75188) Provided verbal/tactile cueing for activities related to improving balance, coordination, kinesthetic sense, posture, motor skill, proprioception to assist with   [] LE / lumbar: LE, proximal hip, and core control in self care, mobility, lifting, ambulation and eccentric single leg control. [x] UE / cervical: cervical, scapular, scapulothoracic and UE control with self care, reaching, carrying, lifting, house/yardwork, driving, computer work.      NMR and Therapeutic Activities:    [x] (78563 or 35369) Provided verbal/tactile cueing for activities related to improving balance, coordination, kinesthetic sense, posture, motor skill, proprioception and motor activation to allow for proper function of   [] LE: / Lumbar core, proximal hip and LE with self care and ADLs  [x] UE / Cervical: cervical, postural, scapular, scapulothoracic and UE control with self care, carrying, lifting, driving, computer work.   [] (30652) Gait Re-education- Provided training and instruction to the patient for proper LE, core and proximal hip recruitment and positioning and eccentric body weight control with ambulation re-education including up and down stairs     Home Exercise Program:    [x] (40684) Reviewed/Progressed HEP activities related to strengthening, flexibility, endurance, ROM of   [] LE / Lumbar: core, proximal hip and LE for functional self-care, mobility, lifting and ambulation/stair navigation   [x] UE / Cervical: cervical, postural, scapular, scapulothoracic and UE control with self care, reaching, carrying, lifting, house/yardwork, driving, computer work  [x] (01578)Reviewed/Progressed HEP activities related to improving balance, coordination, kinesthetic sense, posture, motor skill, proprioception of   [] LE: core, proximal hip and LE for self care, mobility, lifting, and ambulation/stair navigation    [x] UE / Cervical: cervical, postural,  scapular, scapulothoracic and UE control with self care, reaching, carrying, lifting, house/yardwork, driving, computer work    Manual Treatments:  PROM / STM / Oscillations-Mobs:  G-I, II, III, IV (PA's, Inf., Post.)  [x] (66814) Provided manual therapy to mobilize LE, proximal hip and/or LS spine soft tissue/joints for the purpose of modulating pain, promoting relaxation,  increasing ROM, reducing/eliminating soft tissue swelling/inflammation/restriction, improving soft tissue extensibility and allowing for proper ROM for normal function with   [] LE / lumbar: self care, mobility, lifting and ambulation.     [x] UE / Cervical: self care, reaching, carrying, lifting, house/yardwork, driving, computer work. Modalities:  [] (50071) Vasopneumatic compression: Utilized vasopneumatic compression to decrease edema / swelling for the purpose of improving mobility and quad tone / recruitment which will allow for increased overall function including but not limited to self-care, transfers, ambulation, and ascending / descending stairs. Modalities: Consider [facility]    Charges:  Timed Code Treatment Minutes: 39   Total Treatment Minutes: 40     [] EVAL - LOW (29645)   [] EVAL - MOD (38944)  [] EVAL - HIGH (50250)  [] RE-EVAL (28562)  [x] TE (41530) x 2     [] Ionto  [] NMR (27735) x      [] Vaso  [x] Manual (28396) x 1     [] Ultrasound  [] TA x       [] Mech Traction (88256)  [] Gait Training x     [] ES (un) (61429):   [] Aquatic therapy x   [] Other:   [] Group:     GOALS:  Patient stated goal: regain strength and mobility  []? Progressing: []? Met: []? Not Met: []? Adjusted     Therapist goals for Patient:   Short Term Goals: To be achieved in: 2 weeks  1. Independent in HEP and progression per patient tolerance, in order to prevent re-injury. []? Progressing: []? Met: []? Not Met: []? Adjusted  2. Patient will have a decrease in pain to <2/10 on average facilitate improvement in movement, function, and ADLs as indicated by Functional Deficits. []? Progressing: []? Met: []? Not Met: []? Adjusted  3. Patient demonstrates understanding of and compliance with precautions and restrictions following surgery. []? Progressing: []? Met: []? Not Met: []? Adjusted     Long Term Goals: To be achieved in: 6 weeks  1. Disability index score of 25% or less for the UEFI or Quick DASH to assist with reaching prior level of function. []? Progressing: []? Met: []? Not Met: []? Adjusted  2. Patient will demonstrate increased AROM to >140deg elbow flexion, 0 deg elbow extension to allow for proper joint functioning as indicated by patients Functional Deficits. []? Progressing: []?  Met: []? Not Met: []? Adjusted  3. Patient will demonstrate an increase in Strength to >4/5 elbow flex/ext, forearm sup/pron, wrist flex/ext to allow for proper functional mobility as indicated by patients Functional Deficits. []? Progressing: []? Met: []? Not Met: []? Adjusted  4. Patient will return to functional activities including gripping/lifting >8# to shoulder height 25x without increased symptoms or restriction. []? Progressing: []? Met: []? Not Met: []? Adjusted  5. The patient will decrease pain to 0/10 on average and will tolerate weight bearing and resisted elbow flexion at 5/5 without limitations/pain. []? Progressing: []? Met: []? Not Met: []? Adjusted         Overall Progression Towards Functional goals/ Treatment Progress Update:  [] Patient is progressing as expected towards functional goals listed. [] Progression is slowed due to complexities/Impairments listed. [] Progression has been slowed due to co-morbidities. [x] Plan just implemented, too soon to assess goals progression <30days   [] Goals require adjustment due to lack of progress  [] Patient is not progressing as expected and requires additional follow up with physician  [] Other:     Persisting Functional Limitations/Impairments:  []Sleeping []Sitting               []Standing [x]Transfers        []Walking []Kneeling               []Stairs []Squatting / bending   [x]ADLs [x]Reaching  [x]Lifting  [x]Housework  [x]Driving [x]Job related tasks  [x]Sports/Recreation []Other:      ASSESSMENT: Pt. Tolerated therapy today without complaints. Reviewed restrictions at this time. Pt. Improving in tolerance for submax resistance activities this date. Elbow and forearm mobility progressively improving. Corrected technique for scapular activities. Pt. Requires continued progression of post-op protocol in order to safely restore functional strength.      Treatment/Activity Tolerance:  [x] Patient tolerated treatment well [] Patient limited by fatigue  [] Patient limited by pain  [] Patient limited by other medical complications  [] Other:     Prognosis: [x] Good [] Fair  [] Poor    Patient Requires Follow-up: [x] Yes  [] No    PLAN: POC: PT 1x / week for 1 weeks, then 2x/week for 5 weeks. Assess response to HEP. [x] Continue per plan of care [] Alter current plan (see comments)  [] Plan of care initiated [] Hold pending MD visit [] Discharge    Electronically signed by: Jeff Fletcher PT    Note: If patient does not return for scheduled/ recommended follow up visits, this note will serve as a discharge from care along with most recent update on progress.

## 2020-08-18 NOTE — PROGRESS NOTES
History:  Marvin Acosta is here for follow up after right distal biceps tendon repair. Surgery date was 6/26/20. Pain is controlled with current analgesics. Medication(s) being used: Prcocet. The patient's pain is rated at 0/10. Still has some numbness around 1st webspace. He is doing PT at [place]. Physical Examination:  Temp 97.2 °F (36.2 °C) (Infrared)   Ht 5' 11.5\" (1.816 m)   Wt 214 lb (97.1 kg)   BMI 29.43 kg/m²     Patient is awake, alert, and in no acute distress. EPL / FPL / Interossei intact bilaterally. Sensation intact to light touch median, radial, ulnar nerve distribution. Subjectively decreased in 1st webspace. Right hand warm and well perfused. Intact palpable biceps tendon. Assessment:   7 weeks status post right distal biceps tendon repair. Plan:   Continue PT. Ice prn. Refill pain medications as needed. No NSAIDs. Follow up in 2 months for evaluation of progress or prn if problems.

## 2020-08-20 ENCOUNTER — HOSPITAL ENCOUNTER (OUTPATIENT)
Dept: PHYSICAL THERAPY | Age: 55
Setting detail: THERAPIES SERIES
Discharge: HOME | End: 2020-08-20
Payer: COMMERCIAL

## 2020-08-20 PROCEDURE — 97110 THERAPEUTIC EXERCISES: CPT

## 2020-08-20 PROCEDURE — 97140 MANUAL THERAPY 1/> REGIONS: CPT

## 2020-08-20 NOTE — PLAN OF CARE
[address] [place] [address] Physical Therapy     Physical Therapy Re-Certification Plan of Care    Dear  Dr. Bull Klein,    We had the pleasure of treating the following patient for physical therapy services at [address] [place] [address]. A summary of our findings can be found in the updated assessment below. This includes our plan of care. If you have any questions or concerns regarding these findings, please do not hesitate to contact me at the office phone number checked above. Thank you for the referral.     Physician Signature:________________________________Date:__________________  By signing above (or electronic signature), therapists plan is approved by physician      Functional Outcome: Quick DASH: raw score = 23; dysfunction = 27%    Overall Response to Treatment:   [x]Patient is responding well to treatment and improvement is noted with regards  to goals   [x]Patient should continue to improve in reasonable time if they continue HEP   []Patient has plateaued and is no longer responding to skilled PT intervention    []Patient is getting worse and would benefit from return to referring MD   []Patient unable to adhere to initial POC   [x]Other: Pt. Is progressing as expected per protocol s/p distal biceps repair 20. Pt. With slight deficits in elbow flexion and forearm supination ROM. Pt. Continues to have deficits in strength due to inability to progress while allowing for repair to heal. Pt. Will continue to benefit from skilled therapy in order to safely restore functional strength. Date range of Visits: 7/15/20-20  Total Visits: 10    Recommendation:    [x]Continue PT 1-2x / wk for 6-8 weeks.     []Hold PT, pending MD visit        Physical Therapy Daily Treatment Note  Date:  2020    Patient Name:  Jace Alarcon    :  1965  MRN: [id number]     Medical/Treatment Diagnosis Information:  · Diagnosis: S46.211D - Rupture of right distal biceps tendon, subsequent encounter - sx:6/26/20  · Treatment Diagnosis: decreased elbow/shoulder ROM, decreased elbow/shoulder strength, elbow pain, impaired UE function  Insurance/Certification information:  PT Insurance Information: CIGNA / no Marcelene Joselin / no copay / ded met for 2020  Physician Information:  Referring Practitioner: Paty Dow MD  Plan of care signed (Y/N): [x]  Yes []  No     Progress Report: [x]  Yes  []  No     Date Range for reporting period:  Beginning 7/15  Ending 8/20    Progress report due (10 Rx/or 30 days whichever is less): 3/52    Recertification due (POC duration/ or 90 days whichever is less): 10/15/20    Visit # Insurance Allowable Date Range (if applicable)   10 30 9829     Latex Allergy:  [x]NO      []YES  Preferred Language for Healthcare:   [x]English       []other:    Functional Outcome: Quick DASH: raw score = 23; dysfunction = 27%    Pain level:  2/10     SUBJECTIVE: Pt states he held an edger the other day and had some increased soreness in arm. States he didn't use the edger, just held it for several minutes. Continues to be limited in motion and strength due to limitations post surgery with lifting, reaching and performing daily tasks. OBJECTIVE: 8/20     PROM AROM     L R L R   Shoulder IR     50   Shoulder ER     70   Shoulder Flexion      140   Shoulder Abduction      135   Elbow Flexion     136   Elbow Extension     -8   Forearm Supination     75   Forearm Pronation     90   Wrist Flexion     30   Wrist Extension     40     Strength not formally tested at this time due to recent surgery    RESTRICTIONS/PRECAUTIONS: distal biceps repair sx:6/26/20 SEE BEASLEY PROTOCOL FOR DISTAL BICEPS TENDON REPAIR IN MEDIA.     Exercises/Interventions:     Therapeutic Exercises (25593) 25' Resistance Sets Reps Notes        UBE fwd 3'        Elbow AROM flex/ext  3 10 1 set neutral, pronated, supinated   Scapular retraction blue 3 10    Shoulder extension blue 3 10    Forearm supination PROM 3# 5\" 15 Wrist extensor stretch  3 30\" Forearm pronated   Wrist flexor stretch  3 30\" Forearm pronated   Wrist flex 3# 3 10 Forearm supinated   Wrist ext 3# 3 10 Forearm pronated   Triceps band purple 3 10    biceps Lime  3 10 Added 8/20   Band ER blue 3 10    Band IR blue 3 10                                Neuromuscular Re-ed (70509) Therapeutic Activities (72459) x6'       Bicep isometics - man resistance 30, 45, 90, 120 deg 5\" 5ea    Supination isometric - man resistance Forearm neutral 5\" x2 5                         Manual Intervention (56792) 8'       Shoulder flex, abd, IR/ER PROM       Elbow flex/ext PROM  2'     Forearm supination/pronation PROM  3'     Wrist flex/ext PROM  1'     STM common extensor tendon  2'              Patient Education & HEP:  -pt. Instructed to wean from brace in controlled environments  8/7: pt. Declined written instructions for home program    Therapeutic Exercise and NMR EXR  [x] (75588) Provided verbal/tactile cueing for activities related to strengthening, flexibility, endurance, ROM for improvements in  [] LE / Lumbar: LE, proximal hip, and core control with self care, mobility, lifting, ambulation. [x] UE / Cervical: cervical, postural, scapular, scapulothoracic and UE control with self care, reaching, carrying, lifting, house/yardwork, driving, computer work. [x] (72408) Provided verbal/tactile cueing for activities related to improving balance, coordination, kinesthetic sense, posture, motor skill, proprioception to assist with   [] LE / lumbar: LE, proximal hip, and core control in self care, mobility, lifting, ambulation and eccentric single leg control. [x] UE / cervical: cervical, scapular, scapulothoracic and UE control with self care, reaching, carrying, lifting, house/yardwork, driving, computer work.      NMR and Therapeutic Activities:    [x] (77888 or 17689) Provided verbal/tactile cueing for activities related to improving balance, coordination, kinesthetic sense, posture, motor skill, proprioception and motor activation to allow for proper function of   [] LE: / Lumbar core, proximal hip and LE with self care and ADLs  [x] UE / Cervical: cervical, postural, scapular, scapulothoracic and UE control with self care, carrying, lifting, driving, computer work.   [] (62140) Gait Re-education- Provided training and instruction to the patient for proper LE, core and proximal hip recruitment and positioning and eccentric body weight control with ambulation re-education including up and down stairs     Home Exercise Program:    [x] (66255) Reviewed/Progressed HEP activities related to strengthening, flexibility, endurance, ROM of   [] LE / Lumbar: core, proximal hip and LE for functional self-care, mobility, lifting and ambulation/stair navigation   [x] UE / Cervical: cervical, postural, scapular, scapulothoracic and UE control with self care, reaching, carrying, lifting, house/yardwork, driving, computer work  [x] (26234)Reviewed/Progressed HEP activities related to improving balance, coordination, kinesthetic sense, posture, motor skill, proprioception of   [] LE: core, proximal hip and LE for self care, mobility, lifting, and ambulation/stair navigation    [x] UE / Cervical: cervical, postural,  scapular, scapulothoracic and UE control with self care, reaching, carrying, lifting, house/yardwork, driving, computer work    Manual Treatments:  PROM / STM / Oscillations-Mobs:  G-I, II, III, IV (PA's, Inf., Post.)  [x] (85724) Provided manual therapy to mobilize LE, proximal hip and/or LS spine soft tissue/joints for the purpose of modulating pain, promoting relaxation,  increasing ROM, reducing/eliminating soft tissue swelling/inflammation/restriction, improving soft tissue extensibility and allowing for proper ROM for normal function with   [] LE / lumbar: self care, mobility, lifting and ambulation.     [x] UE / Cervical: self care, reaching, carrying, lifting, house/yardwork, driving, computer work. Modalities:  [] (22831) Vasopneumatic compression: Utilized vasopneumatic compression to decrease edema / swelling for the purpose of improving mobility and quad tone / recruitment which will allow for increased overall function including but not limited to self-care, transfers, ambulation, and ascending / descending stairs. Modalities: Consider [facility]    Charges:  Timed Code Treatment Minutes: 39   Total Treatment Minutes: 46     [] EVAL - LOW (80077)   [] EVAL - MOD (98569)  [] EVAL - HIGH (82727)  [] RE-EVAL (06001)  [x] TE (73730) x 2     [] Ionto  [] NMR (92428) x      [] Vaso  [x] Manual (50437) x 1     [] Ultrasound  [] TA x       [] Mech Traction (15520)  [] Gait Training x     [] ES (un) (02534):   [] Aquatic therapy x   [] Other:   [] Group:     GOALS:  Patient stated goal: regain strength and mobility  [x]? Progressing: []? Met: []? Not Met: []? Adjusted     Therapist goals for Patient:   Short Term Goals: To be achieved in: 2 weeks  1. Independent in HEP and progression per patient tolerance, in order to prevent re-injury. []? Progressing: [x]? Met: []? Not Met: []? Adjusted  2. Patient will have a decrease in pain to <2/10 on average facilitate improvement in movement, function, and ADLs as indicated by Functional Deficits. [x]? Progressing: []? Met: []? Not Met: []? Adjusted  3. Patient demonstrates understanding of and compliance with precautions and restrictions following surgery. []? Progressing: [x]? Met: []? Not Met: []? Adjusted     Long Term Goals: To be achieved in: 6 weeks  1. Disability index score of 25% or less for the UEFI or Quick DASH to assist with reaching prior level of function. [x]? Progressing: []? Met: []? Not Met: []? Adjusted  2. Patient will demonstrate increased AROM to >140deg elbow flexion, 0 deg elbow extension to allow for proper joint functioning as indicated by patients Functional Deficits. [x]? Progressing: []? Met: []?  Not Met: []? Adjusted  3. Patient will demonstrate an increase in Strength to >4/5 elbow flex/ext, forearm sup/pron, wrist flex/ext to allow for proper functional mobility as indicated by patients Functional Deficits. []? Progressing: []? Met: [x]? Not Met: []? Adjusted  4. Patient will return to functional activities including gripping/lifting >8# to shoulder height 25x without increased symptoms or restriction. []? Progressing: []? Met: [x]? Not Met: []? Adjusted  5. The patient will decrease pain to 0/10 on average and will tolerate weight bearing and resisted elbow flexion at 5/5 without limitations/pain. [x]? Progressing: []? Met: []? Not Met: []? Adjusted         Overall Progression Towards Functional goals/ Treatment Progress Update:  [x] Patient is progressing as expected towards functional goals listed. [] Progression is slowed due to complexities/Impairments listed. [] Progression has been slowed due to co-morbidities. [] Plan just implemented, too soon to assess goals progression <30days   [] Goals require adjustment due to lack of progress  [] Patient is not progressing as expected and requires additional follow up with physician  [] Other:     Persisting Functional Limitations/Impairments:  []Sleeping []Sitting               []Standing [x]Transfers        []Walking []Kneeling               []Stairs []Squatting / bending   [x]ADLs [x]Reaching  [x]Lifting  [x]Housework  [x]Driving [x]Job related tasks  [x]Sports/Recreation []Other:      ASSESSMENT:  Pt. Tolerated therapy today without complaints. Able to initiate light biceps resistance through motion this date without issue. Pt. Is progressing as expected per protocol s/p distal biceps repair 6/26/20. Pt. Demonstrates functional shoulder mobility without pain. Pt. With slight deficits in elbow flexion and forearm supination ROM. Pt.  Continues to have deficits in strength due to inability to progress while allowing for repair to heal. Pt. Will continue to benefit from skilled therapy in order to safely restore functional strength. Treatment/Activity Tolerance:  [x] Patient tolerated treatment well [] Patient limited by fatigue  [] Patient limited by pain  [] Patient limited by other medical complications  [] Other:     Prognosis: [x] Good [] Fair  [] Poor    Patient Requires Follow-up: [x] Yes  [] No    PLAN: POC: PT 1x / week for 1 weeks, then 2x/week for 5 weeks. Assess response to HEP. [x] Continue per plan of care [] Alter current plan (see comments)  [] Plan of care initiated [] Hold pending MD visit [] Discharge    Electronically signed by: Lindsey Worrell [id number]    Pamela Ramírez PT    Note: If patient does not return for scheduled/ recommended follow up visits, this note will serve as a discharge from care along with most recent update on progress.

## 2020-08-27 ENCOUNTER — HOSPITAL ENCOUNTER (OUTPATIENT)
Dept: PHYSICAL THERAPY | Age: 55
Setting detail: THERAPIES SERIES
Discharge: HOME | End: 2020-08-27
Payer: COMMERCIAL

## 2020-08-27 PROCEDURE — 97110 THERAPEUTIC EXERCISES: CPT | Performed by: PHYSICAL THERAPIST

## 2020-08-27 NOTE — FLOWSHEET NOTE
NMR and Therapeutic Activities:    [x] (91271 or 97018) Provided verbal/tactile cueing for activities related to improving balance, coordination, kinesthetic sense, posture, motor skill, proprioception and motor activation to allow for proper function of   [] LE: / Lumbar core, proximal hip and LE with self care and ADLs  [x] UE / Cervical: cervical, postural, scapular, scapulothoracic and UE control with self care, carrying, lifting, driving, computer work.   [] (55305) Gait Re-education- Provided training and instruction to the patient for proper LE, core and proximal hip recruitment and positioning and eccentric body weight control with ambulation re-education including up and down stairs     Home Exercise Program:    [x] (39676) Reviewed/Progressed HEP activities related to strengthening, flexibility, endurance, ROM of   [] LE / Lumbar: core, proximal hip and LE for functional self-care, mobility, lifting and ambulation/stair navigation   [x] UE / Cervical: cervical, postural, scapular, scapulothoracic and UE control with self care, reaching, carrying, lifting, house/yardwork, driving, computer work  [x] (26044)Reviewed/Progressed HEP activities related to improving balance, coordination, kinesthetic sense, posture, motor skill, proprioception of   [] LE: core, proximal hip and LE for self care, mobility, lifting, and ambulation/stair navigation    [x] UE / Cervical: cervical, postural,  scapular, scapulothoracic and UE control with self care, reaching, carrying, lifting, house/yardwork, driving, computer work    Manual Treatments:  PROM / STM / Oscillations-Mobs:  G-I, II, III, IV (PA's, Inf., Post.)  [x] (88754) Provided manual therapy to mobilize LE, proximal hip and/or LS spine soft tissue/joints for the purpose of modulating pain, promoting relaxation,  increasing ROM, reducing/eliminating soft tissue swelling/inflammation/restriction, improving soft tissue extensibility and allowing for proper ROM for normal function with   [] LE / lumbar: self care, mobility, lifting and ambulation. [x] UE / Cervical: self care, reaching, carrying, lifting, house/yardwork, driving, computer work. Modalities:  [] (34899) Vasopneumatic compression: Utilized vasopneumatic compression to decrease edema / swelling for the purpose of improving mobility and quad tone / recruitment which will allow for increased overall function including but not limited to self-care, transfers, ambulation, and ascending / descending stairs. Modalities:     Charges:  Timed Code Treatment Minutes: 38   Total Treatment Minutes: 43     [] EVAL - LOW (60670)   [] EVAL - MOD (01696)  [] EVAL - HIGH (87961)  [] RE-EVAL (08045)  [x] TE (81538) x 3     [] Ionto  [] NMR (98366) x      [] Vaso  [] Manual (22228) x      [] Ultrasound  [] TA x       [] Mech Traction (32725)  [] Gait Training x     [] ES (un) (19991):   [] Aquatic therapy x   [] Other:   [] Group:     GOALS:  Patient stated goal: regain strength and mobility  [x]? Progressing: []? Met: []? Not Met: []? Adjusted     Therapist goals for Patient:   Short Term Goals: To be achieved in: 2 weeks  1. Independent in HEP and progression per patient tolerance, in order to prevent re-injury. []? Progressing: [x]? Met: []? Not Met: []? Adjusted  2. Patient will have a decrease in pain to <2/10 on average facilitate improvement in movement, function, and ADLs as indicated by Functional Deficits. [x]? Progressing: []? Met: []? Not Met: []? Adjusted  3. Patient demonstrates understanding of and compliance with precautions and restrictions following surgery. []? Progressing: [x]? Met: []? Not Met: []? Adjusted     Long Term Goals: To be achieved in: 6 weeks  1. Disability index score of 25% or less for the UEFI or Quick DASH to assist with reaching prior level of function. [x]? Progressing: []? Met: []? Not Met: []? Adjusted  2.  Patient will demonstrate increased AROM to >140deg elbow flexion, 0 deg elbow extension to allow for proper joint functioning as indicated by patients Functional Deficits. [x]? Progressing: []? Met: []? Not Met: []? Adjusted  3. Patient will demonstrate an increase in Strength to >4/5 elbow flex/ext, forearm sup/pron, wrist flex/ext to allow for proper functional mobility as indicated by patients Functional Deficits. []? Progressing: []? Met: [x]? Not Met: []? Adjusted  4. Patient will return to functional activities including gripping/lifting >8# to shoulder height 25x without increased symptoms or restriction. []? Progressing: []? Met: [x]? Not Met: []? Adjusted  5. The patient will decrease pain to 0/10 on average and will tolerate weight bearing and resisted elbow flexion at 5/5 without limitations/pain. [x]? Progressing: []? Met: []? Not Met: []? Adjusted         Overall Progression Towards Functional goals/ Treatment Progress Update:  [x] Patient is progressing as expected towards functional goals listed. [] Progression is slowed due to complexities/Impairments listed. [] Progression has been slowed due to co-morbidities. [] Plan just implemented, too soon to assess goals progression <30days   [] Goals require adjustment due to lack of progress  [] Patient is not progressing as expected and requires additional follow up with physician  [] Other:     Persisting Functional Limitations/Impairments:  []Sleeping []Sitting               []Standing [x]Transfers        []Walking []Kneeling               []Stairs []Squatting / bending   [x]ADLs [x]Reaching  [x]Lifting  [x]Housework  [x]Driving [x]Job related tasks  [x]Sports/Recreation []Other:      ASSESSMENT:  Pt. Tolerated therapy today without complaints. Pt. Appropriately challenged by resistance activities. Cueing for controlled movement with pronation/supination and with bicep curls. Pt. Demonstrates improved elbow and forearm mobility. Initiated wall pushups for light WB without issue.  Able to increase resistance with scapular and rotator cuff bands, occasional cueing for technique when fatigued. Pt. Requires continued progression of post-op protocol in order to safely restore functional strength of R arm. Treatment/Activity Tolerance:  [x] Patient tolerated treatment well [] Patient limited by fatigue  [] Patient limited by pain  [] Patient limited by other medical complications  [] Other:     Prognosis: [x] Good [] Fair  [] Poor    Patient Requires Follow-up: [x] Yes  [] No    PLAN: POC: reduce frequency to 1x/wk   [x] Continue per plan of care [] Alter current plan (see comments)  [] Plan of care initiated [] Hold pending MD visit [] Discharge    Electronically signed by: Celia Bay PT    Note: If patient does not return for scheduled/ recommended follow up visits, this note will serve as a discharge from care along with most recent update on progress.

## 2020-09-03 ENCOUNTER — HOSPITAL ENCOUNTER (OUTPATIENT)
Dept: PHYSICAL THERAPY | Age: 55
Setting detail: THERAPIES SERIES
Discharge: HOME | End: 2020-09-03
Payer: COMMERCIAL

## 2020-09-03 PROCEDURE — 97110 THERAPEUTIC EXERCISES: CPT | Performed by: PHYSICAL THERAPIST

## 2020-09-03 PROCEDURE — 97112 NEUROMUSCULAR REEDUCATION: CPT | Performed by: PHYSICAL THERAPIST

## 2020-09-03 NOTE — FLOWSHEET NOTE
lifting, house/yardwork, driving, computer work.      NMR and Therapeutic Activities:    [x] (11211 or 83976) Provided verbal/tactile cueing for activities related to improving balance, coordination, kinesthetic sense, posture, motor skill, proprioception and motor activation to allow for proper function of   [] LE: / Lumbar core, proximal hip and LE with self care and ADLs  [x] UE / Cervical: cervical, postural, scapular, scapulothoracic and UE control with self care, carrying, lifting, driving, computer work.   [] (88829) Gait Re-education- Provided training and instruction to the patient for proper LE, core and proximal hip recruitment and positioning and eccentric body weight control with ambulation re-education including up and down stairs     Home Exercise Program:    [x] (74805) Reviewed/Progressed HEP activities related to strengthening, flexibility, endurance, ROM of   [] LE / Lumbar: core, proximal hip and LE for functional self-care, mobility, lifting and ambulation/stair navigation   [x] UE / Cervical: cervical, postural, scapular, scapulothoracic and UE control with self care, reaching, carrying, lifting, house/yardwork, driving, computer work  [x] (61136)Reviewed/Progressed HEP activities related to improving balance, coordination, kinesthetic sense, posture, motor skill, proprioception of   [] LE: core, proximal hip and LE for self care, mobility, lifting, and ambulation/stair navigation    [x] UE / Cervical: cervical, postural,  scapular, scapulothoracic and UE control with self care, reaching, carrying, lifting, house/yardwork, driving, computer work    Manual Treatments:  PROM / STM / Oscillations-Mobs:  G-I, II, III, IV (PA's, Inf., Post.)  [x] (46020) Provided manual therapy to mobilize LE, proximal hip and/or LS spine soft tissue/joints for the purpose of modulating pain, promoting relaxation,  increasing ROM, reducing/eliminating soft tissue swelling/inflammation/restriction, improving soft demonstrate increased AROM to >140deg elbow flexion, 0 deg elbow extension to allow for proper joint functioning as indicated by patients Functional Deficits. [x]? Progressing: []? Met: []? Not Met: []? Adjusted  3. Patient will demonstrate an increase in Strength to >4/5 elbow flex/ext, forearm sup/pron, wrist flex/ext to allow for proper functional mobility as indicated by patients Functional Deficits. []? Progressing: []? Met: [x]? Not Met: []? Adjusted  4. Patient will return to functional activities including gripping/lifting >8# to shoulder height 25x without increased symptoms or restriction. []? Progressing: []? Met: [x]? Not Met: []? Adjusted  5. The patient will decrease pain to 0/10 on average and will tolerate weight bearing and resisted elbow flexion at 5/5 without limitations/pain. [x]? Progressing: []? Met: []? Not Met: []? Adjusted         Overall Progression Towards Functional goals/ Treatment Progress Update:  [x] Patient is progressing as expected towards functional goals listed. [] Progression is slowed due to complexities/Impairments listed. [] Progression has been slowed due to co-morbidities. [] Plan just implemented, too soon to assess goals progression <30days   [] Goals require adjustment due to lack of progress  [] Patient is not progressing as expected and requires additional follow up with physician  [] Other:     Persisting Functional Limitations/Impairments:  []Sleeping []Sitting               []Standing [x]Transfers        []Walking []Kneeling               []Stairs []Squatting / bending   [x]ADLs [x]Reaching  [x]Lifting  [x]Housework  [x]Driving [x]Job related tasks  [x]Sports/Recreation []Other:      ASSESSMENT:  Pt. Tolerated therapy today without complaints. Corrected technique with stretching activities. Pt. Continues to be fatigued by WB activities. Initiated body blade for light reciprocal contraction.  Able to increase wrist and bicep resistance activities without issue. Pt. Demonstrates functional elbow and forearm mobility. Good control with scapular and IR/ER bands. Pt. Will continue to benefit from skilled therapy in order to restore functional strength of R UE. Treatment/Activity Tolerance:  [x] Patient tolerated treatment well [] Patient limited by fatigue  [] Patient limited by pain  [] Patient limited by other medical complications  [] Other:     Prognosis: [x] Good [] Fair  [] Poor    Patient Requires Follow-up: [x] Yes  [] No    PLAN: POC: 1x/wk   [x] Continue per plan of care [] Alter current plan (see comments)  [] Plan of care initiated [] Hold pending MD visit [] Discharge    Electronically signed by: Doyle Laguna PT    Note: If patient does not return for scheduled/ recommended follow up visits, this note will serve as a discharge from care along with most recent update on progress.

## 2020-09-11 ENCOUNTER — HOSPITAL ENCOUNTER (OUTPATIENT)
Dept: PHYSICAL THERAPY | Age: 55
Setting detail: THERAPIES SERIES
Discharge: HOME | End: 2020-09-11
Payer: COMMERCIAL

## 2020-09-11 PROCEDURE — 97110 THERAPEUTIC EXERCISES: CPT | Performed by: PHYSICAL THERAPIST

## 2020-09-11 PROCEDURE — 97112 NEUROMUSCULAR REEDUCATION: CPT | Performed by: PHYSICAL THERAPIST

## 2020-09-11 NOTE — FLOWSHEET NOTE
[facility] - Outpatient Physical Therapy        Physical Therapy Daily Treatment Note  Date:  2020    Patient Name:  Beth Ritter    :  1965  MRN: [id number]     Medical/Treatment Diagnosis Information:  · Diagnosis: S46.211D - Rupture of right distal biceps tendon, subsequent encounter - sx:20  · Treatment Diagnosis: decreased elbow/shoulder ROM, decreased elbow/shoulder strength, elbow pain, impaired UE function  Insurance/Certification information:  PT Insurance Information: [employer] / no [place] / no copay / Molinda Chew met for   Physician Information:  Referring Practitioner: Renny Zimmerman MD  Plan of care signed (Y/N): [x]  Yes []  No     Progress Report: []  Yes  [x]  No     Date Range for reporting period:  Beginning   Ending     Progress report due (10 Rx/or 30 days whichever is less):     Recertification due (POC duration/ or 90 days whichever is less): 10/15/20    Visit # Insurance Allowable Date Range (if applicable)    0417     Latex Allergy:  [x]NO      []YES  Preferred Language for Healthcare:   [x]English       []other:    Functional Outcome: Quick DASH: raw score = 23; dysfunction = 27%    Pain level:  0/10     SUBJECTIVE: Pt. Reports that his arm is feeling really good. He was able to do some yardwork with minimal soreness. He continues to have some difficulty with quick movements. Pt. Reports compliance with HEP. OBJECTIVE:      PROM AROM     L R L R   Shoulder IR     50   Shoulder ER     70   Shoulder Flexion      140   Shoulder Abduction      135   Elbow Flexion     136   Elbow Extension     -8   Forearm Supination     75   Forearm Pronation     90   Wrist Flexion     30   Wrist Extension     40     Strength not formally tested at this time due to recent surgery    RESTRICTIONS/PRECAUTIONS: distal biceps repair sx:20 SEE [facility] PROT[facility]OL FOR DISTAL BICEPS TENDON REPAIR IN MEDIA.     Exercises/Interventions:     Therapeutic Exercises (78027) x33' Resistance Sets Reps Notes        UBE fwd 3'          1 set neutral, pronated, supinated   Scapular retraction purple 3 10    Shoulder extension purple 3 10         Wrist extensor stretch  3 30\" Forearm supinated   Wrist flexor stretch  3 30\" Forearm pronated   Wrist flex 5# 3 10 Forearm supinated   Wrist ext 5# 3 10 Forearm pronated   Forearm pronation/supination 5# 3 10 Cueing for control through motion   Triceps kickback 5# 3 10    biceps 5#  3 10    SL ER 4# 3 10    Band IR    therabar pronation/supination green 3 10ea                         Neuromuscular Re-ed (39194) Therapeutic Activities (73711) x10'             Push-up plus Raised table 3 10 Cueing for symmetry and correct movement   Body blade - shd neutral  -ant/post & IR/ER  30\" 2ea    BOW 1# U/d, s/s, cw, ccw x30ea    [facility]                    Manual Intervention (62689)        Shoulder flex, abd, IR/ER PROM       Elbow flex/ext PROM      Forearm supination/pronation PROM      Wrist flex/ext PROM      STM common extensor tendon               Patient Education & HEP:  -pt. Instructed to wean from brace in controlled environments  8/7: pt. Declined written instructions for home program    Therapeutic Exercise and NMR EXR  [x] (17947) Provided verbal/tactile cueing for activities related to strengthening, flexibility, endurance, ROM for improvements in  [] LE / Lumbar: LE, proximal hip, and core control with self care, mobility, lifting, ambulation. [x] UE / Cervical: cervical, postural, scapular, scapulothoracic and UE control with self care, reaching, carrying, lifting, house/yardwork, driving, computer work. [x] (78482) Provided verbal/tactile cueing for activities related to improving balance, coordination, kinesthetic sense, posture, motor skill, proprioception to assist with   [] LE / lumbar: LE, proximal hip, and core control in self care, mobility, lifting, ambulation and eccentric single leg control.    [x] UE / cervical: cervical, scapular, scapulothoracic and UE control with self care, reaching, carrying, lifting, house/yardwork, driving, computer work.      NMR and Therapeutic Activities:    [x] (88435 or 08499) Provided verbal/tactile cueing for activities related to improving balance, coordination, kinesthetic sense, posture, motor skill, proprioception and motor activation to allow for proper function of   [] LE: / Lumbar core, proximal hip and LE with self care and ADLs  [x] UE / Cervical: cervical, postural, scapular, scapulothoracic and UE control with self care, carrying, lifting, driving, computer work.   [] (12823) Gait Re-education- Provided training and instruction to the patient for proper LE, core and proximal hip recruitment and positioning and eccentric body weight control with ambulation re-education including up and down stairs     Home Exercise Program:    [x] (26981) Reviewed/Progressed HEP activities related to strengthening, flexibility, endurance, ROM of   [] LE / Lumbar: core, proximal hip and LE for functional self-care, mobility, lifting and ambulation/stair navigation   [x] UE / Cervical: cervical, postural, scapular, scapulothoracic and UE control with self care, reaching, carrying, lifting, house/yardwork, driving, computer work  [x] (81427)Reviewed/Progressed HEP activities related to improving balance, coordination, kinesthetic sense, posture, motor skill, proprioception of   [] LE: core, proximal hip and LE for self care, mobility, lifting, and ambulation/stair navigation    [x] UE / Cervical: cervical, postural,  scapular, scapulothoracic and UE control with self care, reaching, carrying, lifting, house/yardwork, driving, computer work    Manual Treatments:  PROM / STM / Oscillations-Mobs:  G-I, II, III, IV (PA's, Inf., Post.)  [x] (94587) Provided manual therapy to mobilize LE, proximal hip and/or LS spine soft tissue/joints for the purpose of modulating pain, promoting relaxation, prior level of function. [x]? Progressing: []? Met: []? Not Met: []? Adjusted  2. Patient will demonstrate increased AROM to >140deg elbow flexion, 0 deg elbow extension to allow for proper joint functioning as indicated by patients Functional Deficits. [x]? Progressing: []? Met: []? Not Met: []? Adjusted  3. Patient will demonstrate an increase in Strength to >4/5 elbow flex/ext, forearm sup/pron, wrist flex/ext to allow for proper functional mobility as indicated by patients Functional Deficits. []? Progressing: []? Met: [x]? Not Met: []? Adjusted  4. Patient will return to functional activities including gripping/lifting >8# to shoulder height 25x without increased symptoms or restriction. []? Progressing: []? Met: [x]? Not Met: []? Adjusted  5. The patient will decrease pain to 0/10 on average and will tolerate weight bearing and resisted elbow flexion at 5/5 without limitations/pain. [x]? Progressing: []? Met: []? Not Met: []? Adjusted         Overall Progression Towards Functional goals/ Treatment Progress Update:  [x] Patient is progressing as expected towards functional goals listed. [] Progression is slowed due to complexities/Impairments listed. [] Progression has been slowed due to co-morbidities. [] Plan just implemented, too soon to assess goals progression <30days   [] Goals require adjustment due to lack of progress  [] Patient is not progressing as expected and requires additional follow up with physician  [] Other:     Persisting Functional Limitations/Impairments:  []Sleeping []Sitting               []Standing []Transfers        []Walking []Kneeling               []Stairs []Squatting / bending   []ADLs []Reaching  [x]Lifting  [x]Housework  []Driving [x]Job related tasks  [x]Sports/Recreation []Other:      ASSESSMENT:  Pt. Tolerated therapy today without complaints. Able to increase resistance activities without increased pain. Pt. Demonstrates functional elbow and forearm mobility. Pt. Appropriately challenged by increases in resistance activities. Able to progress WB through shoulder. Plan to added light double limb plyos NPV per protocol. Pt. Will continue to benefit from skilled therapy in order to progress UE functional strength to allow pt. To return to PLOF. Treatment/Activity Tolerance:  [x] Patient tolerated treatment well [] Patient limited by fatigue  [] Patient limited by pain  [] Patient limited by other medical complications  [] Other:     Prognosis: [x] Good [] Fair  [] Poor    Patient Requires Follow-up: [x] Yes  [] No    PLAN: POC: 1x/wk   [x] Continue per plan of care [] Alter current plan (see comments)  [] Plan of care initiated [] Hold pending MD visit [] Discharge    Electronically signed by: Eagle Patrick PT    Note: If patient does not return for scheduled/ recommended follow up visits, this note will serve as a discharge from care along with most recent update on progress.

## 2020-09-17 ENCOUNTER — HOSPITAL ENCOUNTER (OUTPATIENT)
Dept: PHYSICAL THERAPY | Age: 55
Setting detail: THERAPIES SERIES
Discharge: HOME | End: 2020-09-17
Payer: COMMERCIAL

## 2020-09-17 PROCEDURE — 97112 NEUROMUSCULAR REEDUCATION: CPT | Performed by: PHYSICAL THERAPIST

## 2020-09-17 PROCEDURE — 97110 THERAPEUTIC EXERCISES: CPT | Performed by: PHYSICAL THERAPIST

## 2020-09-17 NOTE — FLOWSHEET NOTE
[facility] - Outpatient Physical Therapy        Physical Therapy Daily Treatment Note  Date:  2020    Patient Name:  Meliza Michelle    :  1965  MRN: [id number]     Medical/Treatment Diagnosis Information:  · Diagnosis: S46.211D - Rupture of right distal biceps tendon, subsequent encounter - sx:20  · Treatment Diagnosis: decreased elbow/shoulder ROM, decreased elbow/shoulder strength, elbow pain, impaired UE function  Insurance/Certification information:  PT Insurance Information: CIG[employer] / no Donnise Dent / no copay / Prudence Alek met for   Physician Information:  Referring Practitioner: Lisa Cedeno MD  Plan of care signed (Y/N): [x]  Yes []  No     Progress Report: []  Yes  [x]  No     Date Range for reporting period:  Beginning   Ending     Progress report due (10 Rx/or 30 days whichever is less):  NPV    Recertification due (POC duration/ or 90 days whichever is less): 10/15/20    Visit # Insurance Allowable Date Range (if applicable)   7756     Latex Allergy:  [x]NO      []YES  Preferred Language for Healthcare:   [x]English       []other:    Functional Outcome: Quick DASH: raw score = 23; dysfunction = 27%    Pain level:  0/10     SUBJECTIVE: Elbow is still stiff at times with persisting weakness but feels he is making steady progress with function. OBJECTIVE:      PROM AROM     L R L R   Shoulder IR     50   Shoulder ER     70   Shoulder Flexion      140   Shoulder Abduction      135   Elbow Flexion     136   Elbow Extension     -8   Forearm Supination     75   Forearm Pronation     90   Wrist Flexion     30   Wrist Extension     40     Strength not formally tested at this time due to recent surgery    RESTRICTIONS/PRECAUTIONS: distal biceps repair sx:20 SEE BEASLEY PROTOCOL FOR DISTAL BICEPS TENDON REPAIR IN MEDIA.     Exercises/Interventions:     Therapeutic Exercises (86167) x31' Resistance Sets Reps Notes   UBE fwd 3'     Scapular retraction purple 3 10 Shoulder extension purple 3 10    Wrist extensor stretch  3 30\" Forearm supinated   Wrist flexor stretch  3 30\" Forearm pronated   Wrist flex 5# 3 10 Forearm supinated   Wrist ext 5# 3 10 Forearm pronated   Forearm pronation/supination 5# 3 10 Cueing for control through motion   Triceps kickback 5# 3 10    biceps 5#  3 10    SL ER 5# 3 10    therabar pronation/supination green 3 10ea     walks 15# 30ft 2    TRX rows BW 2 10           Neuromuscular Re-ed (92699) Therapeutic Activities (21728) x9'       Push-up plus Raised table 3 10 Cueing for symmetry and correct movement   Body blade - shd neutral  -ant/post & IR/ER  30\" 2ea    BOW 1# U/d, s/s, cw, ccw x30ea    Chest pass to plyoback 4# 1 30 Cueing for symmetry                 Manual Intervention (44533)        Shoulder flex, abd, IR/ER PROM       Elbow flex/ext PROM      Forearm supination/pronation PROM      Wrist flex/ext PROM      STM common extensor tendon               Patient Education & HEP:  -pt. Instructed to wean from brace in controlled environments  8/7: pt. Declined written instructions for home program    Therapeutic Exercise and NMR EXR  [x] (37378) Provided verbal/tactile cueing for activities related to strengthening, flexibility, endurance, ROM for improvements in  [] LE / Lumbar: LE, proximal hip, and core control with self care, mobility, lifting, ambulation. [x] UE / Cervical: cervical, postural, scapular, scapulothoracic and UE control with self care, reaching, carrying, lifting, house/yardwork, driving, computer work. [x] (85307) Provided verbal/tactile cueing for activities related to improving balance, coordination, kinesthetic sense, posture, motor skill, proprioception to assist with   [] LE / lumbar: LE, proximal hip, and core control in self care, mobility, lifting, ambulation and eccentric single leg control.    [x] UE / cervical: cervical, scapular, scapulothoracic and UE control with self care, reaching, carrying, lifting, house/yardwork, driving, computer work.      NMR and Therapeutic Activities:    [x] (34622 or 08766) Provided verbal/tactile cueing for activities related to improving balance, coordination, kinesthetic sense, posture, motor skill, proprioception and motor activation to allow for proper function of   [] LE: / Lumbar core, proximal hip and LE with self care and ADLs  [x] UE / Cervical: cervical, postural, scapular, scapulothoracic and UE control with self care, carrying, lifting, driving, computer work.   [] (82823) Gait Re-education- Provided training and instruction to the patient for proper LE, core and proximal hip recruitment and positioning and eccentric body weight control with ambulation re-education including up and down stairs     Home Exercise Program:    [x] (86493) Reviewed/Progressed HEP activities related to strengthening, flexibility, endurance, ROM of   [] LE / Lumbar: core, proximal hip and LE for functional self-care, mobility, lifting and ambulation/stair navigation   [x] UE / Cervical: cervical, postural, scapular, scapulothoracic and UE control with self care, reaching, carrying, lifting, house/yardwork, driving, computer work  [x] (46420)Reviewed/Progressed HEP activities related to improving balance, coordination, kinesthetic sense, posture, motor skill, proprioception of   [] LE: core, proximal hip and LE for self care, mobility, lifting, and ambulation/stair navigation    [x] UE / Cervical: cervical, postural,  scapular, scapulothoracic and UE control with self care, reaching, carrying, lifting, house/yardwork, driving, computer work    Manual Treatments:  PROM / STM / Oscillations-Mobs:  G-I, II, III, IV (PA's, Inf., Post.)  [x] (51672) Provided manual therapy to mobilize LE, proximal hip and/or LS spine soft tissue/joints for the purpose of modulating pain, promoting relaxation,  increasing ROM, reducing/eliminating soft tissue swelling/inflammation/restriction, improving soft tissue extensibility and allowing for proper ROM for normal function with   [] LE / lumbar: self care, mobility, lifting and ambulation. [x] UE / Cervical: self care, reaching, carrying, lifting, house/yardwork, driving, computer work. Modalities:  [] (87994) Vasopneumatic compression: Utilized vasopneumatic compression to decrease edema / swelling for the purpose of improving mobility and quad tone / recruitment which will allow for increased overall function including but not limited to self-care, transfers, ambulation, and ascending / descending stairs. Modalities:     Charges:  Timed Code Treatment Minutes: 40   Total Treatment Minutes: 45     [] EVAL - LOW (04671)   [] EVAL - MOD (91379)  [] EVAL - HIGH (71259)  [] RE-EVAL (77071)  [x] TE (85589) x 2     [] Ionto  [x] NMR (47083) x 1     [] Vaso  [] Manual (67050) x      [] Ultrasound  [] TA x       [] Mech Traction (14700)  [] Gait Training x     [] ES (un) (20954):   [] Aquatic therapy x   [] Other:   [] Group:     GOALS:  Patient stated goal: regain strength and mobility  [x]? Progressing: []? Met: []? Not Met: []? Adjusted     Therapist goals for Patient:   Short Term Goals: To be achieved in: 2 weeks  1. Independent in HEP and progression per patient tolerance, in order to prevent re-injury. []? Progressing: [x]? Met: []? Not Met: []? Adjusted  2. Patient will have a decrease in pain to <2/10 on average facilitate improvement in movement, function, and ADLs as indicated by Functional Deficits. [x]? Progressing: []? Met: []? Not Met: []? Adjusted  3. Patient demonstrates understanding of and compliance with precautions and restrictions following surgery. []? Progressing: [x]? Met: []? Not Met: []? Adjusted     Long Term Goals: To be achieved in: 6 weeks  1. Disability index score of 25% or less for the UEFI or Quick DASH to assist with reaching prior level of function. [x]? Progressing: []? Met: []? Not Met: []? Adjusted  2.  Patient will demonstrate increased AROM to >140deg elbow flexion, 0 deg elbow extension to allow for proper joint functioning as indicated by patients Functional Deficits. [x]? Progressing: []? Met: []? Not Met: []? Adjusted  3. Patient will demonstrate an increase in Strength to >4/5 elbow flex/ext, forearm sup/pron, wrist flex/ext to allow for proper functional mobility as indicated by patients Functional Deficits. []? Progressing: []? Met: [x]? Not Met: []? Adjusted  4. Patient will return to functional activities including gripping/lifting >8# to shoulder height 25x without increased symptoms or restriction. []? Progressing: []? Met: [x]? Not Met: []? Adjusted  5. The patient will decrease pain to 0/10 on average and will tolerate weight bearing and resisted elbow flexion at 5/5 without limitations/pain. [x]? Progressing: []? Met: []? Not Met: []? Adjusted         Overall Progression Towards Functional goals/ Treatment Progress Update:  [x] Patient is progressing as expected towards functional goals listed. [] Progression is slowed due to complexities/Impairments listed. [] Progression has been slowed due to co-morbidities. [] Plan just implemented, too soon to assess goals progression <30days   [] Goals require adjustment due to lack of progress  [] Patient is not progressing as expected and requires additional follow up with physician  [] Other:     Persisting Functional Limitations/Impairments:  []Sleeping []Sitting               []Standing []Transfers        []Walking []Kneeling               []Stairs []Squatting / bending   []ADLs []Reaching  [x]Lifting  [x]Housework  []Driving [x]Job related tasks  [x]Sports/Recreation []Other:      ASSESSMENT:  Pt. Tolerated therapy today without complaints. Pt. Demonstrates good elbow and forearm mobility. Upgraded weight with sidelying ER, pt performed exercise with good technique and no significant difficulty but was challenged by upgraded weight.  Pt demonstrates good form with pushups on table and with theraband. Able to initiate light plyometrics without issue. Pt. Requires continued progression of post-op protocol in order to restore functional strength. Treatment/Activity Tolerance:  [x] Patient tolerated treatment well [] Patient limited by fatigue  [] Patient limited by pain  [] Patient limited by other medical complications  [] Other:     Prognosis: [x] Good [] Fair  [] Poor    Patient Requires Follow-up: [x] Yes  [] No    PLAN: POC: 1x/wk   [x] Continue per plan of care [] Alter current plan (see comments)  [] Plan of care initiated [] Hold pending MD visit [] Discharge    Electronically signed by: Dario Flood, PT    Note: If patient does not return for scheduled/ recommended follow up visits, this note will serve as a discharge from care along with most recent update on progress.

## 2020-09-24 ENCOUNTER — HOSPITAL ENCOUNTER (OUTPATIENT)
Dept: PHYSICAL THERAPY | Age: 55
Setting detail: THERAPIES SERIES
Discharge: HOME | End: 2020-09-24
Payer: COMMERCIAL

## 2020-09-24 PROCEDURE — 97112 NEUROMUSCULAR REEDUCATION: CPT | Performed by: PHYSICAL THERAPIST

## 2020-09-24 PROCEDURE — 97110 THERAPEUTIC EXERCISES: CPT | Performed by: PHYSICAL THERAPIST

## 2020-09-24 NOTE — FLOWSHEET NOTE
[facility] - Outpatient Physical Therapy        Physical Therapy Daily Treatment Note  Date:  2020    Patient Name:  Cristina Mario    :  1965  MRN: [id number]     Medical/Treatment Diagnosis Information:  · Diagnosis: S46.211D - Rupture of right distal biceps tendon, subsequent encounter - sx:20  · Treatment Diagnosis: decreased elbow/shoulder ROM, decreased elbow/shoulder strength, elbow pain, impaired UE function  Insurance/Certification information:  PT Insurance Information: CIGNA / no Laci Parra / no brian / Tate Roy met for   Physician Information:  Referring Practitioner: Isabel Mcguire MD  Plan of care signed (Y/N): [x]  Yes []  No     Progress Report: [x]  Yes  []  No     Date Range for reporting period:  Beginning   Ending     Progress report due (10 Rx/or 30 days whichever is less):     Recertification due (POC duration/ or 90 days whichever is less): 10/15/20    Visit # Insurance Allowable Date Range (if applicable)   15 30 5109     Latex Allergy:  [x]NO      []YES  Preferred Language for Healthcare:   [x]English       []other:    Functional Outcome: Quick DASH: raw score = 15; dysfunction = 9%    Pain level:  0/10     SUBJECTIVE: Pt. Reports that his shoulder is feeling much better. He would like to return to working out at the gym. OBJECTIVE:      AROM     L R   Shoulder IR   Slight restriction   Shoulder ER   WFL   Shoulder Flexion    WFL   Shoulder Abduction    WFL   Elbow Flexion   135   Elbow Extension   0   Forearm Supination   WFL   Forearm Pronation   WFL   Wrist Flexion   Slight restriction    Wrist Extension   Slight restriction     Strength (0-5)  Left Right    Shoulder Flex   4+   Shoulder Abd   5   Shoulder ER   5   Shoulder IR   5   Biceps   5   Triceps   5                        RESTRICTIONS/PRECAUTIONS: distal biceps repair sx:20 SEE BEASLEY PROTOCOL FOR DISTAL BICEPS TENDON REPAIR IN MEDIA.     Exercises/Interventions: Therapeutic Exercises (63115) x32' Resistance Sets Reps Notes   UBE fwd 3'     Scapular retraction    Shoulder extension    IR towel stretch  3 30\"    Wrist extensor stretch  3 30\" Forearm supinated   Wrist flexor stretch  3 30\" Forearm pronated   Wrist flex 6# 3 10 Forearm supinated   Wrist ext 6# 3 10 Forearm pronated   Forearm pronation/supination 6# 3 10 Cueing for control through motion   Triceps kickback 10# 3 10    biceps 10#  3 10    SL ER 6# 3 10    therabar pronation/supination green 3 10ea     walks 15# ~30ft 2    TRX rows BW 2 10           Neuromuscular Re-ed (37939) Therapeutic Activities (25748) x10'       Push-up plus table 3 10 Cueing for symmetry and correct movement   Body blade - shd neutral  -ant/post & IR/ER Medium black 30\" 2ea    BOW    Chest pass to plyoback 4# 1 30 Cueing for symmetry   Plank steps airex 1 5 For improved WB and control through UE          Manual Intervention (44104)        Shoulder flex, abd, IR/ER PROM       Elbow flex/ext PROM      Forearm supination/pronation PROM      Wrist flex/ext PROM      STM common extensor tendon               Patient Education & HEP:  -pt. Instructed to wean from brace in controlled environments  8/7: pt. Declined written instructions for home program    Therapeutic Exercise and NMR EXR  [x] (37998) Provided verbal/tactile cueing for activities related to strengthening, flexibility, endurance, ROM for improvements in  [] LE / Lumbar: LE, proximal hip, and core control with self care, mobility, lifting, ambulation. [x] UE / Cervical: cervical, postural, scapular, scapulothoracic and UE control with self care, reaching, carrying, lifting, house/yardwork, driving, computer work.   [x] (91560) Provided verbal/tactile cueing for activities related to improving balance, coordination, kinesthetic sense, posture, motor skill, proprioception to assist with   [] LE / lumbar: LE, proximal hip, and core control in self care, mobility, lifting, ambulation and eccentric single leg control. [x] UE / cervical: cervical, scapular, scapulothoracic and UE control with self care, reaching, carrying, lifting, house/yardwork, driving, computer work.      NMR and Therapeutic Activities:    [x] (86965 or 46956) Provided verbal/tactile cueing for activities related to improving balance, coordination, kinesthetic sense, posture, motor skill, proprioception and motor activation to allow for proper function of   [] LE: / Lumbar core, proximal hip and LE with self care and ADLs  [x] UE / Cervical: cervical, postural, scapular, scapulothoracic and UE control with self care, carrying, lifting, driving, computer work.   [] (79328) Gait Re-education- Provided training and instruction to the patient for proper LE, core and proximal hip recruitment and positioning and eccentric body weight control with ambulation re-education including up and down stairs     Home Exercise Program:    [x] (51226) Reviewed/Progressed HEP activities related to strengthening, flexibility, endurance, ROM of   [] LE / Lumbar: core, proximal hip and LE for functional self-care, mobility, lifting and ambulation/stair navigation   [x] UE / Cervical: cervical, postural, scapular, scapulothoracic and UE control with self care, reaching, carrying, lifting, house/yardwork, driving, computer work  [x] (54105)Reviewed/Progressed HEP activities related to improving balance, coordination, kinesthetic sense, posture, motor skill, proprioception of   [] LE: core, proximal hip and LE for self care, mobility, lifting, and ambulation/stair navigation    [x] UE / Cervical: cervical, postural,  scapular, scapulothoracic and UE control with self care, reaching, carrying, lifting, house/yardwork, driving, computer work    Manual Treatments:  PROM / STM / Oscillations-Mobs:  G-I, II, III, IV (PA's, Inf., Post.)  [x] (82316) Provided manual therapy to mobilize LE, proximal hip and/or LS spine soft tissue/joints for the purpose of modulating pain, promoting relaxation,  increasing ROM, reducing/eliminating soft tissue swelling/inflammation/restriction, improving soft tissue extensibility and allowing for proper ROM for normal function with   [] LE / lumbar: self care, mobility, lifting and ambulation. [x] UE / Cervical: self care, reaching, carrying, lifting, house/yardwork, driving, computer work. Modalities:  [] (22045) Vasopneumatic compression: Utilized vasopneumatic compression to decrease edema / swelling for the purpose of improving mobility and quad tone / recruitment which will allow for increased overall function including but not limited to self-care, transfers, ambulation, and ascending / descending stairs. Modalities:     Charges:  Timed Code Treatment Minutes: 42   Total Treatment Minutes: 44     [] EVAL - LOW (13917)   [] EVAL - MOD (94586)  [] EVAL - HIGH (75694)  [] RE-EVAL (88477)  [x] TE (74073) x 2     [] Ionto  [x] NMR (57094) x 1     [] Vaso  [] Manual (28911) x      [] Ultrasound  [] TA x       [] Mech Traction (69893)  [] Gait Training x     [] ES (un) (53977):   [] Aquatic therapy x   [] Other:   [] Group:     GOALS:  Patient stated goal: regain strength and mobility  [x]? Progressing: []? Met: []? Not Met: []? Adjusted     Therapist goals for Patient:   Short Term Goals: To be achieved in: 2 weeks  1. Independent in HEP and progression per patient tolerance, in order to prevent re-injury. []? Progressing: [x]? Met: []? Not Met: []? Adjusted  2. Patient will have a decrease in pain to <2/10 on average facilitate improvement in movement, function, and ADLs as indicated by Functional Deficits. []? Progressing: [x]? Met: []? Not Met: []? Adjusted  3. Patient demonstrates understanding of and compliance with precautions and restrictions following surgery. []? Progressing: [x]? Met: []? Not Met: []? Adjusted     Long Term Goals: To be achieved in: 6 weeks  1.  Disability index score of 25% or less for the UEFI or Quick DASH to assist with reaching prior level of function. []? Progressing: [x]? Met: []? Not Met: []? Adjusted  2. Patient will demonstrate increased AROM to >140deg elbow flexion, 0 deg elbow extension to allow for proper joint functioning as indicated by patients Functional Deficits. [x]? Progressing: []? Met: []? Not Met: []? Adjusted  3. Patient will demonstrate an increase in Strength to >4/5 elbow flex/ext, forearm sup/pron, wrist flex/ext to allow for proper functional mobility as indicated by patients Functional Deficits. []? Progressing: [x]? Met: []? Not Met: []? Adjusted  4. Patient will return to functional activities including gripping/lifting >8# to shoulder height 25x without increased symptoms or restriction. [x]? Progressing: []? Met: []? Not Met: []? Adjusted  5. The patient will decrease pain to 0/10 on average and will tolerate weight bearing and resisted elbow flexion at 5/5 without limitations/pain. []? Progressing: [x]? Met: []? Not Met: []? Adjusted         Overall Progression Towards Functional goals/ Treatment Progress Update:  [x] Patient is progressing as expected towards functional goals listed. [] Progression is slowed due to complexities/Impairments listed. [] Progression has been slowed due to co-morbidities. [] Plan just implemented, too soon to assess goals progression <30days   [] Goals require adjustment due to lack of progress  [] Patient is not progressing as expected and requires additional follow up with physician  [] Other:     Persisting Functional Limitations/Impairments:  []Sleeping []Sitting               []Standing []Transfers        []Walking []Kneeling               []Stairs []Squatting / bending   []ADLs []Reaching  [x]Lifting  []Housework  []Driving [x]Job related tasks  [x]Sports/Recreation []Other:      ASSESSMENT:  Pt. Tolerated therapy today without complaints. Pt. Has improved significantly in strength and mobility.  Pt. Beckie Coronado to bear weight through UE without pain at this time. Light plyos completed without issue. Pt. Appropriately challenged by increases in resistance activities. Discussed return to lifting at the gym with modifications and slow progression of weights. Pt. Demonstrates understanding. Plan for patient to return in 2-3 wks to review return to gym and for possible d/c from formal therapy. Treatment/Activity Tolerance:  [x] Patient tolerated treatment well [] Patient limited by fatigue  [] Patient limited by pain  [] Patient limited by other medical complications  [] Other:     Prognosis: [x] Good [] Fair  [] Poor    Patient Requires Follow-up: [x] Yes  [] No    PLAN: POC: 1x/wk   [x] Continue per plan of care [] Alter current plan (see comments)  [] Plan of care initiated [] Hold pending MD visit [] Discharge    Electronically signed by: Mack Yates PT    Note: If patient does not return for scheduled/ recommended follow up visits, this note will serve as a discharge from care along with most recent update on progress.

## 2020-10-20 ENCOUNTER — OFFICE VISIT (OUTPATIENT)
Dept: ORTHOPEDIC SURGERY | Age: 55
End: 2020-10-20
Payer: COMMERCIAL

## 2020-10-20 VITALS — WEIGHT: 218 LBS | BODY MASS INDEX: 29.53 KG/M2 | TEMPERATURE: 97.4 F | HEIGHT: 72 IN

## 2020-10-20 PROCEDURE — 99213 OFFICE O/P EST LOW 20 MIN: CPT | Performed by: ORTHOPAEDIC SURGERY

## 2020-10-20 NOTE — PROGRESS NOTES
History:  Johan Alfonso is here for follow up after right distal biceps tendon repair. Surgery date was 6/26/20. Pain is controlled with current analgesics. Medication(s) being used: Prcocet. The patient's pain is rated at 0/10. Still has a very small area of numbness around 1st webspace. He finishing up PT. He has been back to the gym. Past Medical History:   Diagnosis Date    Arthritis     Gout     History of blood clots     2012, 2018    Wrist pain       Past Surgical History:   Procedure Laterality Date    BICEPS TENDON REPAIR Right 6/26/2020    RIGHT DISTAL BICEPS TENDON REPAIR WITH MINI C-ARM performed by Brianna Grider MD at [address]    spiral fracture wtih pins    HAND SURGERY Right     right hand fracture     KNEE CARTILAGE SURGERY Right 03/09/2012    meniscus tear repair    OTHER SURGICAL HISTORY      B undescended testes    TONSILLECTOMY       Current Outpatient Medications   Medication Sig Dispense Refill    allopurinol (ZYLOPRIM) 300 MG tablet Take 1 tablet by mouth daily 30 tablet 3     No current facility-administered medications for this visit.       Allergies   Allergen Reactions    Pcn [Penicillins]      Not sure of reaction, happened as a child     Family History   Problem Relation Age of Onset    Diabetes Mother     Heart Disease Mother     High Blood Pressure Mother     Alzheimer's Disease Mother     No Known Problems Father     No Known Problems Sister     Alzheimer's Disease Maternal Grandmother     No Known Problems Sister      Social History     Socioeconomic History    Marital status:      Spouse name: Not on file    Number of children: 2    Years of education: Not on file    Highest education level: Not on file   Occupational History    Occupation:    Social Needs    Financial resource strain: Not on file    Food insecurity     Worry: Not on file     Inability: Not on file    Transportation needs     Medical: Not on file     Non-medical: Not on file   Tobacco Use    Smoking status: Never Smoker    Smokeless tobacco: Never Used   Substance and Sexual Activity    Alcohol use: Yes     Alcohol/week: 5.0 standard drinks     Types: 5 Cans of beer per week     Frequency: 2-3 times a week     Drinks per session: 3 or 4     Binge frequency: Never    Drug use: No    Sexual activity: Yes     Partners: Female   Lifestyle    Physical activity     Days per week: Not on file     Minutes per session: Not on file    Stress: Not on file   Relationships    Social connections     Talks on phone: Not on file     Gets together: Not on file     Attends Oriental orthodox service: Not on file     Active member of club or organization: Not on file     Attends meetings of clubs or organizations: Not on file     Relationship status: Not on file    Intimate partner violence     Fear of current or ex partner: Not on file     Emotionally abused: Not on file     Physically abused: Not on file     Forced sexual activity: Not on file   Other Topics Concern    Not on file   Social History Narrative    Not on file       Review of Systems:  Pertinent items are noted in HPI. 13 point review of systems from Patient History Form dated 6/10/20 and available in the patient's chart under the Media tab. No interval changes. Physical Examination:  Temp 97.4 °F (36.3 °C)   Ht 6' (1.829 m)   Wt 218 lb (98.9 kg)   BMI 29.57 kg/m²      Patient is awake, alert, and in no acute distress. EPL / FPL / Interossei intact bilaterally. Sensation intact to light touch median, radial, ulnar nerve distribution. Minimal subjectively decreased in 1st webspace. Right hand warm and well perfused. Intact palpable biceps tendon. Nontender. Bilateral elbow ROM 0-120      Assessment:   3 1/2 months status post right distal biceps tendon repair. Plan:   Finish PT. Continue HEP. Ice / heat prn. NSAIDs prn. Follow up in 2 months prn if problems.

## 2020-12-22 RX ORDER — ALLOPURINOL 300 MG/1
300 TABLET ORAL DAILY
Qty: 90 TABLET | Refills: 0 | Status: SHIPPED | OUTPATIENT
Start: 2020-12-22 | End: 2021-05-24 | Stop reason: SDUPTHER

## 2020-12-22 NOTE — TELEPHONE ENCOUNTER
Medication:   Requested Prescriptions     Pending Prescriptions Disp Refills    allopurinol (ZYLOPRIM) 300 MG tablet [Pharmacy Med Name: Allopurinol 300 MG Tablet] 90 tablet 3     Sig: TAKE 1 TABLET BY MOUTH DAILY          Patient Phone Number: [phone] (home)     Last appt: 6/22/2020   Next appt: Visit date not found    Last OARRS: No flowsheet data found.   PDMP Monitoring:    Last PDMP Joselito Elizabeth as Reviewed [facility]):  Review User Review Instant Review Result          Preferred Pharmacy:   Bnei Lua [address] [place] [address] Vidal Vasqueslucycorinne 7 [phone] Wily Mazariegos [phone]  [address] [place] [address]  Phone: [phone] Fax: [phone] [address] [facility] 43, [address] [facility] [phone]  [address] [place] [address]  Phone: [phone] Fax: [phone]

## 2021-05-24 RX ORDER — ALLOPURINOL 300 MG/1
300 TABLET ORAL DAILY
Qty: 90 TABLET | Refills: 0 | Status: SHIPPED | OUTPATIENT
Start: 2021-05-24 | End: 2021-10-25

## 2021-05-24 NOTE — TELEPHONE ENCOUNTER
Medication:   Requested Prescriptions      No prescriptions requested or ordered in this encounter          Patient Phone Number: [phone] (home)     Last appt: 6/22/2020   Next appt: Visit date not found    Last OARRS: No flowsheet data found.   PDMP Monitoring:    Last PDMP Madi Daley as Reviewed [facility]):  Review User Review Instant Review Result          Preferred Pharmacy:   [address] Vidal Mackay  [phone] Leo Petty [phone]  [address] [place] [address]  Phone: [phone] Fax: [phone]    [facility] . [address]. [facility] 43, [address] [facility] [phone]  [address] [place] [address] [address] [place] [address] [address] [place] [address]  Phone: [phone] Fax: [phone]

## 2021-12-27 RX ORDER — ALLOPURINOL 300 MG/1
TABLET ORAL
Qty: 30 TABLET | Refills: 11 | Status: SHIPPED | OUTPATIENT
Start: 2021-12-27

## 2023-03-07 RX ORDER — ALLOPURINOL 300 MG/1
TABLET ORAL
Qty: 90 TABLET | Refills: 0 | OUTPATIENT
Start: 2023-03-07

## 2023-03-07 NOTE — TELEPHONE ENCOUNTER
Medication:   Requested Prescriptions      No prescriptions requested or ordered in this encounter          Patient Phone Number: [phone] (home)     Last appt: 6/22/2020   Next appt: Visit date not found    Last OARRS: No flowsheet data found.   PDMP Monitoring:    Last PDMP Lexie Henriquez as Reviewed [facility]):  Review User Review Instant Review Result          Preferred Pharmacy:   [address] Vidal Mackay  [phone] - F [phone]  [address] [place] [address]  Phone: [phone] Fax: [phone] [address], [address] [facility] [phone]  [address] [place] [address] [address] [address]  Phone: [phone] Fax: [phone] Siva Wolff [address] [place] [address] [phone] - f [phone]  [address] [place] [address]  Phone: [phone] Fax: [phone]

## 2023-03-09 RX ORDER — ALLOPURINOL 300 MG/1
TABLET ORAL
Qty: 30 TABLET | Refills: 11 | OUTPATIENT
Start: 2023-03-09

## 2023-03-14 RX ORDER — ALLOPURINOL 300 MG/1
TABLET ORAL
Qty: 30 TABLET | Refills: 11 | OUTPATIENT
Start: 2023-03-14

## 2023-05-02 SDOH — ECONOMIC STABILITY: TRANSPORTATION INSECURITY
IN THE PAST 12 MONTHS, HAS LACK OF TRANSPORTATION KEPT YOU FROM MEETINGS, WORK, OR FROM GETTING THINGS NEEDED FOR DAILY LIVING?: NO

## 2023-05-02 SDOH — ECONOMIC STABILITY: FOOD INSECURITY: WITHIN THE PAST 12 MONTHS, THE FOOD YOU BOUGHT JUST DIDN'T LAST AND YOU DIDN'T HAVE MONEY TO GET MORE.: NEVER TRUE

## 2023-05-02 SDOH — ECONOMIC STABILITY: INCOME INSECURITY: HOW HARD IS IT FOR YOU TO PAY FOR THE VERY BASICS LIKE FOOD, HOUSING, MEDICAL CARE, AND HEATING?: NOT HARD AT ALL

## 2023-05-02 SDOH — ECONOMIC STABILITY: HOUSING INSECURITY
IN THE LAST 12 MONTHS, WAS THERE A TIME WHEN YOU DID NOT HAVE A STEADY PLACE TO SLEEP OR SLEPT IN A SHELTER (INCLUDING NOW)?: NO

## 2023-05-02 SDOH — ECONOMIC STABILITY: FOOD INSECURITY: WITHIN THE PAST 12 MONTHS, YOU WORRIED THAT YOUR FOOD WOULD RUN OUT BEFORE YOU GOT MONEY TO BUY MORE.: NEVER TRUE

## 2023-05-04 ENCOUNTER — OFFICE VISIT (OUTPATIENT)
Dept: FAMILY MEDICINE CLINIC | Age: 58
End: 2023-05-04
Payer: COMMERCIAL

## 2023-05-04 VITALS
BODY MASS INDEX: 30.61 KG/M2 | OXYGEN SATURATION: 94 % | DIASTOLIC BLOOD PRESSURE: 88 MMHG | SYSTOLIC BLOOD PRESSURE: 138 MMHG | HEIGHT: 72 IN | WEIGHT: 226 LBS | TEMPERATURE: 97.3 F | HEART RATE: 114 BPM

## 2023-05-04 DIAGNOSIS — N48.9 PENILE LESION: Primary | ICD-10-CM

## 2023-05-04 DIAGNOSIS — Z13.1 SCREENING FOR DIABETES MELLITUS: ICD-10-CM

## 2023-05-04 DIAGNOSIS — Z12.5 SCREENING FOR MALIGNANT NEOPLASM OF PROSTATE: ICD-10-CM

## 2023-05-04 DIAGNOSIS — M10.072 ACUTE IDIOPATHIC GOUT OF LEFT FOOT: ICD-10-CM

## 2023-05-04 DIAGNOSIS — B37.42 CANDIDAL BALANITIS: ICD-10-CM

## 2023-05-04 DIAGNOSIS — Z13.220 SCREENING FOR CHOLESTEROL LEVEL: Primary | ICD-10-CM

## 2023-05-04 PROCEDURE — 99214 OFFICE O/P EST MOD 30 MIN: CPT | Performed by: NURSE PRACTITIONER

## 2023-05-04 RX ORDER — ALLOPURINOL 300 MG/1
TABLET ORAL
Qty: 30 TABLET | Refills: 11 | Status: SHIPPED | OUTPATIENT
Start: 2023-05-04

## 2023-05-04 RX ORDER — FLUCONAZOLE 100 MG/1
100 TABLET ORAL DAILY
Qty: 7 TABLET | Refills: 0 | Status: SHIPPED | OUTPATIENT
Start: 2023-05-04 | End: 2023-05-11

## 2023-05-04 ASSESSMENT — PATIENT HEALTH QUESTIONNAIRE - PHQ9
5. POOR APPETITE OR OVEREATING: 0
SUM OF ALL RESPONSES TO PHQ QUESTIONS 1-9: 0
6. FEELING BAD ABOUT YOURSELF - OR THAT YOU ARE A FAILURE OR HAVE LET YOURSELF OR YOUR FAMILY DOWN: 0
8. MOVING OR SPEAKING SO SLOWLY THAT OTHER PEOPLE COULD HAVE NOTICED. OR THE OPPOSITE, BEING SO FIGETY OR RESTLESS THAT YOU HAVE BEEN MOVING AROUND A LOT MORE THAN USUAL: 0
9. THOUGHTS THAT YOU WOULD BE BETTER OFF DEAD, OR OF HURTING YOURSELF: 0
3. TROUBLE FALLING OR STAYING ASLEEP: 0
1. LITTLE INTEREST OR PLEASURE IN DOING THINGS: 0
7. TROUBLE CONCENTRATING ON THINGS, SUCH AS READING THE NEWSPAPER OR WATCHING TELEVISION: 0
SUM OF ALL RESPONSES TO PHQ QUESTIONS 1-9: 0
SUM OF ALL RESPONSES TO PHQ9 QUESTIONS 1 & 2: 0
2. FEELING DOWN, DEPRESSED OR HOPELESS: 0
SUM OF ALL RESPONSES TO PHQ QUESTIONS 1-9: 0
4. FEELING TIRED OR HAVING LITTLE ENERGY: 0
SUM OF ALL RESPONSES TO PHQ QUESTIONS 1-9: 0
10. IF YOU CHECKED OFF ANY PROBLEMS, HOW DIFFICULT HAVE THESE PROBLEMS MADE IT FOR YOU TO DO YOUR WORK, TAKE CARE OF THINGS AT HOME, OR GET ALONG WITH OTHER PEOPLE: 0

## 2023-05-04 NOTE — PROGRESS NOTES
for 7 days  Dispense: 7 tablet; Refill: 0  - Clotrimazole 1 % OINT; Apply 1 application topically in the morning and at bedtime  Dispense: 56.7 g; Refill: 0    3. Acute idiopathic gout of left foot  controlled  - allopurinol (ZYLOPRIM) 300 MG tablet; TAKE 1 TABLET DAILY  Dispense: 30 tablet; Refill: 11  Uric acid       Return in about 1 month (around 6/4/2023) for annual check up, lab review. This dictation was generated by voice recognition computer software. Although all attempts are made to edit the dictation for accuracy, there may be errors in the transcription that are not intended.

## 2023-05-08 ENCOUNTER — TELEPHONE (OUTPATIENT)
Dept: FAMILY MEDICINE CLINIC | Age: 58
End: 2023-05-08

## 2023-05-08 DIAGNOSIS — E78.2 MIXED HYPERLIPIDEMIA: Primary | ICD-10-CM

## 2023-05-08 RX ORDER — ATORVASTATIN CALCIUM 20 MG/1
20 TABLET, FILM COATED ORAL DAILY
Qty: 30 TABLET | Refills: 3 | Status: SHIPPED | OUTPATIENT
Start: 2023-05-08

## 2023-05-08 NOTE — TELEPHONE ENCOUNTER
Called Pt, gave results. States the cream is not working well, has been putting on BID. States that he is not able to keep the cream on for very long given the location. Please advise. Advised new medication, states he will  today and start.

## 2023-05-08 NOTE — TELEPHONE ENCOUNTER
Labs showed very uncontrolled cholesterol, have sent over lowering agent, advise tighter control diet and weight loss. Elevated uric acid, all other labs NL. How is topical cream working for lesion?

## 2023-06-06 NOTE — PROGRESS NOTES
Here for annual physical.  No concerns    Dental: up-to-date  Eye: been awhile, aware he is due    Colonoscopy: ordered, but patient unsure if wants to do this or another form of screening    Exercise: goes to gym 3-4 times a week  Diet: 2 meals daily with diet in the middle    Living Will: No,   Additional information provided      [facility] Scores 6/8/2023 5/4/2023 10/8/2019 6/28/2018 5/11/2018   PHQ2 Score 0 0 0 0 2   PHQ9 Score 0 0 0 0 2     Still lesion on penis, no better after meds. HM reviewed with pt    Patient's medications, allergies, past medical, surgical, social and family histories were reviewed and updated in the EHR as appropriate. Vitals:    06/08/23 0830   BP: 110/60   Site: Left Upper Arm   Position: Sitting   Cuff Size: Medium Adult   Pulse: 83   Temp: 97.3 °F (36.3 °C)   TempSrc: Infrared   SpO2: 98%   Weight: 223 lb 6.4 oz (101.3 kg)   Height: 5' 11.25\" (1.81 m)     Wt Readings from Last 3 Encounters:   06/08/23 223 lb 6.4 oz (101.3 kg)   05/04/23 226 lb (102.5 kg)   10/20/20 218 lb (98.9 kg)     Body mass index is 30.94 kg/m². GENERAL Alert and oriented x 4 NAD, affect appropriate and obese, well hydrated, well developed.   NECK:supple and non tender without mass, no thyromegaly or thyroid nodules, no cervical lymphadenopathy  HEENT: TM clear bilaterally  LUNG:clear to auscultation bilaterally with normal respiratory effort  CV: Normal heart sounds, regular rate and rhythm without murmurs  EXTREMETY: no loss of hair, no edema, normal pedal pulses bilaterally  NEURO: CN grossly intact, moving all extremities equally, no gross deficits          ASSESSMENT AND PLAN:       Arcadio Coto was seen today for annual exam.    Diagnoses and all orders for this visit:    Well adult exam  Recommended screenings discussed and ordered if patient agreed  Recommended vaccinations discussed and ordered if patient agreed  Encouraged healthy diet   Encouraged regular exercise and maintaining a healthy

## 2023-06-08 ENCOUNTER — OFFICE VISIT (OUTPATIENT)
Dept: FAMILY MEDICINE CLINIC | Age: 58
End: 2023-06-08
Payer: COMMERCIAL

## 2023-06-08 VITALS
WEIGHT: 223.4 LBS | SYSTOLIC BLOOD PRESSURE: 110 MMHG | OXYGEN SATURATION: 98 % | HEART RATE: 83 BPM | BODY MASS INDEX: 31.27 KG/M2 | HEIGHT: 71 IN | TEMPERATURE: 97.3 F | DIASTOLIC BLOOD PRESSURE: 60 MMHG

## 2023-06-08 DIAGNOSIS — E78.2 MIXED HYPERLIPIDEMIA: ICD-10-CM

## 2023-06-08 DIAGNOSIS — Z00.00 WELL ADULT EXAM: Primary | ICD-10-CM

## 2023-06-08 DIAGNOSIS — N48.9 PENILE LESION: ICD-10-CM

## 2023-06-08 DIAGNOSIS — M10.072 ACUTE IDIOPATHIC GOUT OF LEFT FOOT: ICD-10-CM

## 2023-06-08 DIAGNOSIS — Z12.11 SCREEN FOR COLON CANCER: ICD-10-CM

## 2023-06-08 PROBLEM — I82.409 DEEP VENOUS THROMBOSIS (HCC): Status: RESOLVED | Noted: 2018-05-18 | Resolved: 2023-06-08

## 2023-06-08 PROCEDURE — 99396 PREV VISIT EST AGE 40-64: CPT | Performed by: FAMILY MEDICINE

## 2023-06-08 RX ORDER — ALLOPURINOL 300 MG/1
TABLET ORAL
Qty: 90 TABLET | Refills: 3 | Status: SHIPPED | OUTPATIENT
Start: 2023-06-08

## 2023-06-08 RX ORDER — ATORVASTATIN CALCIUM 20 MG/1
20 TABLET, FILM COATED ORAL DAILY
Qty: 90 TABLET | Refills: 3 | Status: SHIPPED | OUTPATIENT
Start: 2023-06-08

## 2023-06-08 ASSESSMENT — PATIENT HEALTH QUESTIONNAIRE - PHQ9
SUM OF ALL RESPONSES TO PHQ QUESTIONS 1-9: 0
SUM OF ALL RESPONSES TO PHQ QUESTIONS 1-9: 0
SUM OF ALL RESPONSES TO PHQ9 QUESTIONS 1 & 2: 0
SUM OF ALL RESPONSES TO PHQ QUESTIONS 1-9: 0
2. FEELING DOWN, DEPRESSED OR HOPELESS: 0
SUM OF ALL RESPONSES TO PHQ QUESTIONS 1-9: 0
1. LITTLE INTEREST OR PLEASURE IN DOING THINGS: 0

## 2023-06-08 NOTE — PATIENT INSTRUCTIONS
--Make appointment to see the eye doctor       --Call to schedule your colonoscopy  --You can also request an appointment on their website:  Nilton Adamson [address] , [facility], [address]  [phone]    Ricardo Ocampo, [facility].  Phone: [phone]     --Dermatologists:      [facility]) Dermatology  [phone]   MD Sabino Box MD Janyce Rei, MD Recardo Pile, MD Jaynie Mallow, MD        [facility]  [address] [facility] [place], [facility] [address]  Phone: [phone]       Comprehensive Dermatology  Dr. Janae Carreon  [phone]  [address] [facility] [place], [place]       Advanced Dermatology and Cosmetic Surgery  Dr. Tiny Queen MD  [phone] 205 [address]      Dermatology and Surgery  Shital Telles MD  [phone]  [address] [place] [address] [address], [address]      [facility]  Renae Terry, [address],[place], [address]  [phone]

## 2024-06-11 ASSESSMENT — PATIENT HEALTH QUESTIONNAIRE - PHQ9
2. FEELING DOWN, DEPRESSED OR HOPELESS: SEVERAL DAYS
SUM OF ALL RESPONSES TO PHQ QUESTIONS 1-9: 2
1. LITTLE INTEREST OR PLEASURE IN DOING THINGS: SEVERAL DAYS
SUM OF ALL RESPONSES TO PHQ QUESTIONS 1-9: 2
SUM OF ALL RESPONSES TO PHQ9 QUESTIONS 1 & 2: 2
SUM OF ALL RESPONSES TO PHQ QUESTIONS 1-9: 2
SUM OF ALL RESPONSES TO PHQ QUESTIONS 1-9: 2

## 2024-06-14 ASSESSMENT — PATIENT HEALTH QUESTIONNAIRE - PHQ9
SUM OF ALL RESPONSES TO PHQ9 QUESTIONS 1 & 2: 2
1. LITTLE INTEREST OR PLEASURE IN DOING THINGS: SEVERAL DAYS
2. FEELING DOWN, DEPRESSED OR HOPELESS: SEVERAL DAYS

## 2024-06-21 NOTE — PROGRESS NOTES
Here for annual physical.  No concerns    Dental: up-to-date  Eye: has been awhile    Colonoscopy: ordered-  No family history of colon cancer    Seatbelt: Always    Exercise:   4-5 times a week , walking and weight lifting  Do you eat balanced/healthy meals regularly? Yes    Living Will and/or Healthcare POA: No,   Additional information provided          6/11/2024     8:59 AM 6/8/2023     8:27 AM 5/4/2023     7:58 AM 10/8/2019    11:42 AM 6/28/2018     8:50 AM 5/11/2018    12:12 PM   PHQ Scores   PHQ2 Score 2    2 0 0 0 0 2   PHQ9 Score 2    2 0 0 0 0 2     Gout- no flare ups in a long time. Taking the allopurinol regularly.     Chol - taking atorvastatin regularly, no SE.     When looks up after a few minutes will notice little dizzy. Noticed not as tolerant of roller coasters. States about once a year will get a more sig flare up where very dizzy and will feel sick with it but lasts a day or so then ok.         HM reviewed with pt    Patient's medications, allergies, past medical, surgical, social and family histories were reviewed and updated in the EHR as appropriate.    Vitals:    06/24/24 1420   BP: 106/78   Site: Left Upper Arm   Position: Sitting   Cuff Size: Large Adult   Pulse: 76   Temp: 97.6 °F (36.4 °C)   TempSrc: Infrared   SpO2: 98%   Weight: 99.7 kg (219 lb 12.8 oz)   Height: 1.816 m (5' 11.5\")     Wt Readings from Last 3 Encounters:   06/24/24 99.7 kg (219 lb 12.8 oz)   06/08/23 101.3 kg (223 lb 6.4 oz)   05/04/23 102.5 kg (226 lb)     Body mass index is 30.23 kg/m².      GENERAL Alert and oriented x 4 NAD, affect appropriate and obese, well hydrated, well developed.  NECK:supple and non tender without mass, no thyromegaly or thyroid nodules, no cervical lymphadenopathy  HEENT: TM clear bilaterally  LUNG:clear to auscultation bilaterally with normal respiratory effort  CV: Normal heart sounds, regular rate and rhythm without murmurs  EXTREMETY: no loss of hair, no edema, normal pedal pulses

## 2024-06-24 ENCOUNTER — OFFICE VISIT (OUTPATIENT)
Dept: FAMILY MEDICINE CLINIC | Age: 59
End: 2024-06-24
Payer: COMMERCIAL

## 2024-06-24 VITALS
BODY MASS INDEX: 29.77 KG/M2 | WEIGHT: 219.8 LBS | TEMPERATURE: 97.6 F | HEIGHT: 72 IN | SYSTOLIC BLOOD PRESSURE: 106 MMHG | OXYGEN SATURATION: 98 % | HEART RATE: 76 BPM | DIASTOLIC BLOOD PRESSURE: 78 MMHG

## 2024-06-24 DIAGNOSIS — Z00.00 WELL ADULT EXAM: Primary | ICD-10-CM

## 2024-06-24 DIAGNOSIS — E78.2 MIXED HYPERLIPIDEMIA: ICD-10-CM

## 2024-06-24 DIAGNOSIS — M1A.0720 IDIOPATHIC CHRONIC GOUT OF LEFT FOOT WITHOUT TOPHUS: ICD-10-CM

## 2024-06-24 DIAGNOSIS — R42 VERTIGO: ICD-10-CM

## 2024-06-24 DIAGNOSIS — Z12.5 SCREENING PSA (PROSTATE SPECIFIC ANTIGEN): ICD-10-CM

## 2024-06-24 DIAGNOSIS — Z12.11 SCREEN FOR COLON CANCER: ICD-10-CM

## 2024-06-24 PROCEDURE — 99396 PREV VISIT EST AGE 40-64: CPT | Performed by: FAMILY MEDICINE

## 2024-06-24 PROCEDURE — 99214 OFFICE O/P EST MOD 30 MIN: CPT | Performed by: FAMILY MEDICINE

## 2024-06-24 RX ORDER — ALLOPURINOL 300 MG/1
TABLET ORAL
Qty: 90 TABLET | Refills: 3 | Status: SHIPPED | OUTPATIENT
Start: 2024-06-24

## 2024-06-24 RX ORDER — ATORVASTATIN CALCIUM 20 MG/1
20 TABLET, FILM COATED ORAL DAILY
Qty: 90 TABLET | Refills: 3 | Status: SHIPPED | OUTPATIENT
Start: 2024-06-24

## 2024-06-24 SDOH — ECONOMIC STABILITY: INCOME INSECURITY: HOW HARD IS IT FOR YOU TO PAY FOR THE VERY BASICS LIKE FOOD, HOUSING, MEDICAL CARE, AND HEATING?: NOT HARD AT ALL

## 2024-06-24 SDOH — ECONOMIC STABILITY: FOOD INSECURITY: WITHIN THE PAST 12 MONTHS, THE FOOD YOU BOUGHT JUST DIDN'T LAST AND YOU DIDN'T HAVE MONEY TO GET MORE.: NEVER TRUE

## 2024-06-24 SDOH — ECONOMIC STABILITY: FOOD INSECURITY: WITHIN THE PAST 12 MONTHS, YOU WORRIED THAT YOUR FOOD WOULD RUN OUT BEFORE YOU GOT MONEY TO BUY MORE.: NEVER TRUE

## 2024-06-24 NOTE — PATIENT INSTRUCTIONS
--Get your Covid vaccine this fall.      --Make sure you get your flu shot this fall. If you are over 65 look for the \"high dose\" flu shot for better protection.     --Make appointment to see the eye doctor       --Call to schedule your colonoscopy  --You can also request an appointment on their website:  [email or web address]    [address], [facility] 107  [phone]    [facility], [facility]  [address]  Phone: [phone]

## 2024-07-12 LAB
ALBUMIN: 4.2 G/DL
ALP BLD-CCNC: 97 U/L
ALT SERPL-CCNC: 22 U/L
AST SERPL-CCNC: 20 U/L
BILIRUB SERPL-MCNC: 0.9 MG/DL (ref 0.1–1.4)
BUN BLDV-MCNC: 19 MG/DL
CALCIUM SERPL-MCNC: 9.3 MG/DL
CHLORIDE BLD-SCNC: 103 MMOL/L
CHOLESTEROL, TOTAL: 174 MG/DL
CHOLESTEROL/HDL RATIO: NORMAL
CO2: 24 MMOL/L
CREAT SERPL-MCNC: 1.36 MG/DL
GFR, ESTIMATED: 60
GLUCOSE BLD-MCNC: 90 MG/DL
HDLC SERPL-MCNC: 35 MG/DL (ref 35–70)
LDL CHOLESTEROL: 103
NONHDLC SERPL-MCNC: NORMAL MG/DL
POTASSIUM SERPL-SCNC: 4.3 MMOL/L
PROSTATE SPECIFIC ANTIGEN: 1.7 NG/ML
SODIUM BLD-SCNC: 141 MMOL/L
TOTAL PROTEIN: 6.3 G/DL (ref 6.4–8.2)
TRIGL SERPL-MCNC: 210 MG/DL
URIC ACID: 6.6
VLDLC SERPL CALC-MCNC: 36 MG/DL

## 2025-02-08 DIAGNOSIS — E78.2 MIXED HYPERLIPIDEMIA: ICD-10-CM

## 2025-02-08 DIAGNOSIS — M1A.0720 IDIOPATHIC CHRONIC GOUT OF LEFT FOOT WITHOUT TOPHUS: ICD-10-CM

## 2025-02-10 RX ORDER — ALLOPURINOL 300 MG/1
TABLET ORAL
Qty: 90 TABLET | Refills: 1 | Status: SHIPPED | OUTPATIENT
Start: 2025-02-10

## 2025-02-10 RX ORDER — ATORVASTATIN CALCIUM 20 MG/1
20 TABLET, FILM COATED ORAL DAILY
Qty: 90 TABLET | Refills: 1 | Status: SHIPPED | OUTPATIENT
Start: 2025-02-10

## 2025-06-24 ASSESSMENT — PATIENT HEALTH QUESTIONNAIRE - PHQ9
2. FEELING DOWN, DEPRESSED OR HOPELESS: NOT AT ALL
SUM OF ALL RESPONSES TO PHQ QUESTIONS 1-9: 0
1. LITTLE INTEREST OR PLEASURE IN DOING THINGS: NOT AT ALL
SUM OF ALL RESPONSES TO PHQ QUESTIONS 1-9: 0
1. LITTLE INTEREST OR PLEASURE IN DOING THINGS: NOT AT ALL
SUM OF ALL RESPONSES TO PHQ9 QUESTIONS 1 & 2: 0
2. FEELING DOWN, DEPRESSED OR HOPELESS: NOT AT ALL

## 2025-06-25 ENCOUNTER — OFFICE VISIT (OUTPATIENT)
Dept: FAMILY MEDICINE CLINIC | Age: 60
End: 2025-06-25
Payer: COMMERCIAL

## 2025-06-25 VITALS
HEART RATE: 83 BPM | HEIGHT: 72 IN | TEMPERATURE: 96.6 F | WEIGHT: 226.2 LBS | BODY MASS INDEX: 30.64 KG/M2 | OXYGEN SATURATION: 97 % | SYSTOLIC BLOOD PRESSURE: 132 MMHG | DIASTOLIC BLOOD PRESSURE: 80 MMHG

## 2025-06-25 DIAGNOSIS — M1A.0720 IDIOPATHIC CHRONIC GOUT OF LEFT FOOT WITHOUT TOPHUS: ICD-10-CM

## 2025-06-25 DIAGNOSIS — Z12.5 SCREENING PSA (PROSTATE SPECIFIC ANTIGEN): ICD-10-CM

## 2025-06-25 DIAGNOSIS — Z00.00 WELL ADULT EXAM: Primary | ICD-10-CM

## 2025-06-25 DIAGNOSIS — S86.812A STRAIN OF LEFT CALF MUSCLE: ICD-10-CM

## 2025-06-25 DIAGNOSIS — B35.3 TINEA PEDIS OF LEFT FOOT: ICD-10-CM

## 2025-06-25 DIAGNOSIS — R19.5 ABNORMAL STOOL CALIBER: ICD-10-CM

## 2025-06-25 DIAGNOSIS — E78.2 MIXED HYPERLIPIDEMIA: ICD-10-CM

## 2025-06-25 PROCEDURE — 99396 PREV VISIT EST AGE 40-64: CPT | Performed by: FAMILY MEDICINE

## 2025-06-25 PROCEDURE — 99214 OFFICE O/P EST MOD 30 MIN: CPT | Performed by: FAMILY MEDICINE

## 2025-06-25 RX ORDER — ATORVASTATIN CALCIUM 20 MG/1
20 TABLET, FILM COATED ORAL DAILY
Qty: 90 TABLET | Refills: 3 | Status: SHIPPED | OUTPATIENT
Start: 2025-06-25

## 2025-06-25 RX ORDER — TERBINAFINE HYDROCHLORIDE 250 MG/1
250 TABLET ORAL DAILY
Qty: 30 TABLET | Refills: 0 | Status: SHIPPED | OUTPATIENT
Start: 2025-06-25 | End: 2025-07-25

## 2025-06-25 RX ORDER — ALLOPURINOL 300 MG/1
TABLET ORAL
Qty: 90 TABLET | Refills: 3 | Status: SHIPPED | OUTPATIENT
Start: 2025-06-25

## 2025-06-25 SDOH — ECONOMIC STABILITY: FOOD INSECURITY: WITHIN THE PAST 12 MONTHS, YOU WORRIED THAT YOUR FOOD WOULD RUN OUT BEFORE YOU GOT MONEY TO BUY MORE.: NEVER TRUE

## 2025-06-25 SDOH — ECONOMIC STABILITY: FOOD INSECURITY: WITHIN THE PAST 12 MONTHS, THE FOOD YOU BOUGHT JUST DIDN'T LAST AND YOU DIDN'T HAVE MONEY TO GET MORE.: NEVER TRUE

## 2025-06-25 NOTE — PATIENT INSTRUCTIONS
--Bring in copy of living will and healthcare power of  for your chart.      --Get your Covid vaccine this fall. If you recently had Covid you can wait 3-4 months before getting it. If you are age 65 or above or are moderately or severely immunocompromised you should consider getting a vaccine every 6 months.       --Make sure you get your flu shot this fall. If you are over 65 look for the \"high dose\" flu shot for better protection.

## 2025-07-02 ENCOUNTER — PATIENT MESSAGE (OUTPATIENT)
Dept: FAMILY MEDICINE CLINIC | Age: 60
End: 2025-07-02

## 2025-07-02 RX ORDER — COLCHICINE 0.6 MG/1
TABLET ORAL
Qty: 30 TABLET | Refills: 3 | Status: SHIPPED | OUTPATIENT
Start: 2025-07-02

## 2025-07-02 NOTE — TELEPHONE ENCOUNTER
Medication:   Requested Prescriptions     Pending Prescriptions Disp Refills    colchicine (COLCRYS) 0.6 MG tablet 30 tablet 3     Sig: Take 2 for acute gout, repeat in 1 hour and then 1 daily prn after until resolved          Patient Phone Number: [phone] (home)     Last appt: 6/25/2025   Next appt: Visit date not found    Last OARRS:        No data to display              PDMP Monitoring:    Last PDMP Bebeto as Reviewed ([place]):  Review User Review Instant Review Result          Preferred Pharmacy:   [facility]/pharmacy #[id number] - [place], [place] - [address] - P [phone] - F [phone]  [address]  [facility] [address]  Phone: [phone] Fax: [phone]    [id number] - [place], [place] - [address] - P [phone] - F [phone] [address]  [facility] [address]  Phone: [phone] Fax: [phone]

## 2025-07-31 ENCOUNTER — PATIENT MESSAGE (OUTPATIENT)
Dept: FAMILY MEDICINE CLINIC | Age: 60
End: 2025-07-31

## (undated) DEVICE — BANDAGE ELASTIC 5YDX4IN ST COMPRSS LF NON ADH

## (undated) DEVICE — SOLUTION IV IRRIG POUR BRL 0.9% SODIUM CHL 2F7124

## (undated) DEVICE — NEEDLE HYPO 23GA L1.5IN TURQ POLYPR HUB S STL THN WALL IM

## (undated) DEVICE — COVER LT HNDL BLU PLAS

## (undated) DEVICE — MERCY HEALTH WEST TURNOVER: Brand: MEDLINE INDUSTRIES, INC.

## (undated) DEVICE — STOCKINETTE,IMPERVIOUS,12X48,STERILE: Brand: MEDLINE

## (undated) DEVICE — ELECTRODE PT RET AD L9FT HI MOIST COND ADH HYDRGEL CORDED

## (undated) DEVICE — Z DISCONTINUED GLOVE SURG SZ 7.5 L12IN FNGR THK13MIL WHT ISOLEX

## (undated) DEVICE — STRIP,CLOSURE,WOUND,MEDI-STRIP,1/2X4: Brand: MEDLINE

## (undated) DEVICE — Z DISCONTINUED PER MEDLINE (LOW STOCK)  USE 2422770 DRAPE C ARM W54XL78IN FOR FLROSCN

## (undated) DEVICE — SUTURE ABSORBABLE BRAIDED 2-0 CT-1 27 IN UD VICRYL J259H

## (undated) DEVICE — NEEDLE SUT MAYO CATGUT NO 2 TAPR PT 1/2 CIR

## (undated) DEVICE — GLOVE ORANGE PI 7 1/2   MSG9075

## (undated) DEVICE — SUTURE VCRL SZ 3-0 L27IN ABSRB UD L26MM SH 1/2 CIR J416H

## (undated) DEVICE — SYRINGE MED 10ML LUERLOCK TIP W/O SFTY DISP

## (undated) DEVICE — DRAPE,U/SHT,SPLIT,FILM,60X84,STERILE: Brand: MEDLINE

## (undated) DEVICE — Device

## (undated) DEVICE — BANDAGE COMPR W4INXL12FT E DISP ESMARCH EVEN

## (undated) DEVICE — MATTRESS MAXI AIR TRNSF SGL PT USE DCS 37 45 48 52 75

## (undated) DEVICE — CHLORAPREP 26ML ORANGE

## (undated) DEVICE — PASSER SUT DIA1.8MM WIRE LOOP STIFF SHFT SHRP ATRAUM TIP

## (undated) DEVICE — SUTURE MCRYL SZ 4-0 L18IN ABSRB UD L19MM PS-2 3/8 CIR PRIM Y496G

## (undated) DEVICE — PADDING UNDERCAST W4INXL4YD 100% COT CRIMPED FINISH WBRL II

## (undated) DEVICE — MAJOR SET UP PK

## (undated) DEVICE — DRAPE HND W114XL142IN BLU POLYPR W O PCH FEN CRD AND TB HLDR

## (undated) DEVICE — 3M™ COBAN™ NL STERILE NON-LATEX SELF-ADHERENT WRAP, 2084S, 4 IN X 5 YD (10 CM X 4,5 M), 18 ROLLS/CASE: Brand: 3M™ COBAN™

## (undated) DEVICE — GOWN SIRUS NONREIN XL W/TWL: Brand: MEDLINE INDUSTRIES, INC.

## (undated) DEVICE — INTENDED FOR TISSUE SEPARATION, AND OTHER PROCEDURES THAT REQUIRE A SHARP SURGICAL BLADE TO PUNCTURE OR CUT.: Brand: BARD-PARKER ® STAINLESS STEEL BLADES

## (undated) DEVICE — CANISTER, RIGID, 1200CC: Brand: MEDLINE INDUSTRIES, INC.

## (undated) DEVICE — SHEET,DRAPE,53X77,STERILE: Brand: MEDLINE

## (undated) DEVICE — 3M™ STERI-DRAPE™ U-DRAPE 1015: Brand: STERI-DRAPE™

## (undated) DEVICE — SPONGE GZ W4XL4IN COT 12 PLY TYP VII WVN C FLD DSGN